# Patient Record
Sex: MALE | Race: OTHER | HISPANIC OR LATINO | Employment: FULL TIME | URBAN - METROPOLITAN AREA
[De-identification: names, ages, dates, MRNs, and addresses within clinical notes are randomized per-mention and may not be internally consistent; named-entity substitution may affect disease eponyms.]

---

## 2017-02-21 ENCOUNTER — ALLSCRIPTS OFFICE VISIT (OUTPATIENT)
Dept: OTHER | Facility: OTHER | Age: 37
End: 2017-02-21

## 2017-02-21 DIAGNOSIS — I10 ESSENTIAL (PRIMARY) HYPERTENSION: ICD-10-CM

## 2017-03-24 ENCOUNTER — LAB CONVERSION - ENCOUNTER (OUTPATIENT)
Dept: OTHER | Facility: OTHER | Age: 37
End: 2017-03-24

## 2017-03-24 LAB
A/G RATIO (HISTORICAL): 1.7 (CALC) (ref 1–2.5)
ALBUMIN SERPL BCP-MCNC: 4.3 G/DL (ref 3.6–5.1)
ALP SERPL-CCNC: 54 U/L (ref 40–115)
ALT SERPL W P-5'-P-CCNC: 22 U/L (ref 9–46)
AST SERPL W P-5'-P-CCNC: 16 U/L (ref 10–40)
BASOPHILS # BLD AUTO: 0.4 %
BASOPHILS # BLD AUTO: 28 CELLS/UL (ref 0–200)
BILIRUB SERPL-MCNC: 0.4 MG/DL (ref 0.2–1.2)
BUN SERPL-MCNC: 14 MG/DL (ref 7–25)
BUN/CREA RATIO (HISTORICAL): NORMAL (CALC) (ref 6–22)
CALCIUM SERPL-MCNC: 9.2 MG/DL (ref 8.6–10.3)
CHLORIDE SERPL-SCNC: 106 MMOL/L (ref 98–110)
CHOLEST SERPL-MCNC: 174 MG/DL (ref 125–200)
CHOLEST/HDLC SERPL: 4.4 (CALC)
CO2 SERPL-SCNC: 26 MMOL/L (ref 20–31)
CREAT SERPL-MCNC: 0.88 MG/DL (ref 0.6–1.35)
DEPRECATED RDW RBC AUTO: 14.2 % (ref 11–15)
EGFR AFRICAN AMERICAN (HISTORICAL): 128 ML/MIN/1.73M2
EGFR-AMERICAN CALC (HISTORICAL): 111 ML/MIN/1.73M2
EOSINOPHIL # BLD AUTO: 1.9 %
EOSINOPHIL # BLD AUTO: 131 CELLS/UL (ref 15–500)
GAMMA GLOBULIN (HISTORICAL): 2.5 G/DL (CALC) (ref 1.9–3.7)
GLUCOSE (HISTORICAL): 94 MG/DL (ref 65–99)
HCT VFR BLD AUTO: 46.1 % (ref 38.5–50)
HDLC SERPL-MCNC: 40 MG/DL
HGB BLD-MCNC: 15.3 G/DL (ref 13.2–17.1)
LDL CHOLESTEROL (HISTORICAL): 115 MG/DL (CALC)
LYMPHOCYTES # BLD AUTO: 2001 CELLS/UL (ref 850–3900)
LYMPHOCYTES # BLD AUTO: 29 %
MCH RBC QN AUTO: 30.6 PG (ref 27–33)
MCHC RBC AUTO-ENTMCNC: 33.1 G/DL (ref 32–36)
MCV RBC AUTO: 92.5 FL (ref 80–100)
MONOCYTES # BLD AUTO: 428 CELLS/UL (ref 200–950)
MONOCYTES (HISTORICAL): 6.2 %
NEUTROPHILS # BLD AUTO: 4313 CELLS/UL (ref 1500–7800)
NEUTROPHILS # BLD AUTO: 62.5 %
NON-HDL-CHOL (CHOL-HDL) (HISTORICAL): 134 MG/DL (CALC)
PLATELET # BLD AUTO: 247 THOUSAND/UL (ref 140–400)
PMV BLD AUTO: 9.2 FL (ref 7.5–12.5)
POTASSIUM SERPL-SCNC: 4.6 MMOL/L (ref 3.5–5.3)
RBC # BLD AUTO: 4.98 MILLION/UL (ref 4.2–5.8)
SODIUM SERPL-SCNC: 140 MMOL/L (ref 135–146)
TOTAL PROTEIN (HISTORICAL): 6.8 G/DL (ref 6.1–8.1)
TRIGL SERPL-MCNC: 95 MG/DL
TSH SERPL DL<=0.05 MIU/L-ACNC: 0.91 MIU/L (ref 0.4–4.5)
WBC # BLD AUTO: 6.9 THOUSAND/UL (ref 3.8–10.8)

## 2017-08-31 ENCOUNTER — GENERIC CONVERSION - ENCOUNTER (OUTPATIENT)
Dept: OTHER | Facility: OTHER | Age: 37
End: 2017-08-31

## 2018-01-13 VITALS — WEIGHT: 196.13 LBS | SYSTOLIC BLOOD PRESSURE: 124 MMHG | TEMPERATURE: 98.7 F | DIASTOLIC BLOOD PRESSURE: 70 MMHG

## 2018-10-11 ENCOUNTER — OFFICE VISIT (OUTPATIENT)
Dept: FAMILY MEDICINE CLINIC | Facility: CLINIC | Age: 38
End: 2018-10-11
Payer: COMMERCIAL

## 2018-10-11 VITALS
TEMPERATURE: 96.2 F | SYSTOLIC BLOOD PRESSURE: 100 MMHG | BODY MASS INDEX: 30.57 KG/M2 | DIASTOLIC BLOOD PRESSURE: 80 MMHG | HEIGHT: 67 IN | WEIGHT: 194.8 LBS

## 2018-10-11 DIAGNOSIS — Z00.00 ROUTINE ADULT HEALTH MAINTENANCE: ICD-10-CM

## 2018-10-11 DIAGNOSIS — R35.1 NOCTURIA: ICD-10-CM

## 2018-10-11 DIAGNOSIS — R35.0 URINARY FREQUENCY: Primary | ICD-10-CM

## 2018-10-11 DIAGNOSIS — E78.5 BORDERLINE HYPERLIPIDEMIA: ICD-10-CM

## 2018-10-11 PROBLEM — J06.9 ACUTE UPPER RESPIRATORY INFECTION: Status: ACTIVE | Noted: 2017-02-21

## 2018-10-11 PROBLEM — I10 BENIGN ESSENTIAL HYPERTENSION: Status: ACTIVE | Noted: 2017-02-21

## 2018-10-11 PROCEDURE — 90715 TDAP VACCINE 7 YRS/> IM: CPT | Performed by: FAMILY MEDICINE

## 2018-10-11 PROCEDURE — 90472 IMMUNIZATION ADMIN EACH ADD: CPT | Performed by: FAMILY MEDICINE

## 2018-10-11 PROCEDURE — 90682 RIV4 VACC RECOMBINANT DNA IM: CPT | Performed by: FAMILY MEDICINE

## 2018-10-11 PROCEDURE — 99395 PREV VISIT EST AGE 18-39: CPT | Performed by: FAMILY MEDICINE

## 2018-10-11 PROCEDURE — 90471 IMMUNIZATION ADMIN: CPT | Performed by: FAMILY MEDICINE

## 2018-10-11 PROCEDURE — 99214 OFFICE O/P EST MOD 30 MIN: CPT | Performed by: FAMILY MEDICINE

## 2018-10-11 RX ORDER — MULTIVITAMIN
1 CAPSULE ORAL DAILY
COMMUNITY

## 2018-10-11 NOTE — PROGRESS NOTES
Assessment/Plan:    55-year-old male with:  Urinary frequency, nocturia borderline hyperlipidemia  Discussed workup and treatment options with risks and benefits  Will check urine and PSA  Will begin trial of Myrbetriq daily and advised patient to call back if symptoms worsen otherwise will follow up in 1 month  Discussed continued healthy diet and exercise and other supportive care  Discussed limiting dietary caffeine, spicy foods, acidic foods and alcohol as these can worsen his symptoms as well  No problem-specific Assessment & Plan notes found for this encounter  Diagnoses and all orders for this visit:    Urinary frequency  -     Mirabegron ER (MYRBETRIQ) 25 MG TB24; Take 25 mg by mouth daily  -     PSA Total, Diagnostic; Future  -     Urinalysis with reflex to microscopic  -     Urine culture; Future    Nocturia  -     Mirabegron ER (MYRBETRIQ) 25 MG TB24; Take 25 mg by mouth daily  -     PSA Total, Diagnostic; Future  -     Urinalysis with reflex to microscopic  -     Urine culture; Future    Borderline hyperlipidemia    Other orders  -     Multiple Vitamin (MULTIVITAMIN) capsule; Take 1 capsule by mouth daily          Subjective:   Chief Complaint   Patient presents with    Well Check     Pt states that they have been urinating too much since hernia surgery last august      Immunizations     Pt would like flu and TDAP vaccine  Patient ID: Von Pickett is a 45 y o  male  Patient is a 55-year-old male who presents for follow-up on urinary frequency, and nocturia that is been going on for the past year  Patient admits that is getting worse and especially is bad during the day with alcohol and also at night when he wakes up 3-4 times at night  No dysuria or flank pain  No testicular pain  No other complaints at this point  Patient is borderline hyperlipidemia but has been very well controlled and generally is healthy with regards to his lifestyle    No fevers chills nausea vomiting  Tolerating p o  intake  No other complaints at this time  The following portions of the patient's history were reviewed and updated as appropriate: allergies, current medications, past family history, past medical history, past social history, past surgical history and problem list     Review of Systems   Constitutional: Negative  HENT: Negative  Eyes: Negative  Respiratory: Negative  Cardiovascular: Negative  Gastrointestinal: Negative  Endocrine: Negative  Genitourinary: Positive for frequency and urgency  Negative for dysuria, flank pain, penile pain, penile swelling and testicular pain  Musculoskeletal: Negative  Allergic/Immunologic: Negative  Neurological: Negative  Hematological: Negative  Psychiatric/Behavioral: Negative  All other systems reviewed and are negative  Objective:      /80 (BP Location: Right arm, Patient Position: Sitting, Cuff Size: Standard)   Temp (!) 96 2 °F (35 7 °C) (Tympanic)   Ht 5' 7" (1 702 m)   Wt 88 4 kg (194 lb 12 8 oz)   BMI 30 51 kg/m²          Physical Exam   Constitutional: He is oriented to person, place, and time  He appears well-developed and well-nourished  HENT:   Head: Atraumatic  Right Ear: External ear normal    Left Ear: External ear normal    Eyes: Pupils are equal, round, and reactive to light  Conjunctivae and EOM are normal    Neck: Normal range of motion  Cardiovascular: Normal rate, regular rhythm and normal heart sounds  Pulmonary/Chest: Effort normal and breath sounds normal  No respiratory distress  Abdominal: Soft  He exhibits no distension  There is no tenderness  There is no rebound and no guarding  Musculoskeletal: Normal range of motion  Neurological: He is alert and oriented to person, place, and time  No cranial nerve deficit  Skin: Skin is warm and dry  Psychiatric: He has a normal mood and affect   His behavior is normal  Judgment and thought content normal

## 2018-10-11 NOTE — PROGRESS NOTES
Assessment/Plan:    61-year-old male with: Annual health maintenance exam   Discussed various safety and health maintenance issues including healthy diet like the Mediterranean diet, exercise, healthy weight as tolerated, ample sleep and stress reduction  Discussed supportive care return parameters  Patient to get flu shot and Tdap as well  No problem-specific Assessment & Plan notes found for this encounter  Diagnoses and all orders for this visit:    Urinary frequency  -     Mirabegron ER (MYRBETRIQ) 25 MG TB24; Take 25 mg by mouth daily  -     PSA Total, Diagnostic; Future  -     Urinalysis with reflex to microscopic  -     Urine culture; Future    Nocturia  -     Mirabegron ER (MYRBETRIQ) 25 MG TB24; Take 25 mg by mouth daily  -     PSA Total, Diagnostic; Future  -     Urinalysis with reflex to microscopic  -     Urine culture; Future    Borderline hyperlipidemia    Routine adult health maintenance    Other orders  -     Multiple Vitamin (MULTIVITAMIN) capsule; Take 1 capsule by mouth daily          Subjective:   Chief complaint:  Routine health maintenance exam     Patient ID: Alena Medina is a 45 y o  male  Patient is a 61-year-old male who presents for an annual well visit  Patient admits that he is physically active in generally eats a healthy diet  He sleeps well  No acute health maintenance complaints  He needs the flu shot and a Tdap as his sister is pregnant  The following portions of the patient's history were reviewed and updated as appropriate: allergies, current medications, past family history, past medical history, past social history, past surgical history and problem list     Review of Systems   Constitutional: Negative  HENT: Negative  Eyes: Negative  Respiratory: Negative  Cardiovascular: Negative  Gastrointestinal: Negative  Endocrine: Negative  Genitourinary: Positive for frequency and urgency  Musculoskeletal: Negative  Allergic/Immunologic: Negative  Neurological: Negative  Hematological: Negative  Psychiatric/Behavioral: Negative  All other systems reviewed and are negative  Objective:      /80 (BP Location: Right arm, Patient Position: Sitting, Cuff Size: Standard)   Temp (!) 96 2 °F (35 7 °C) (Tympanic)   Ht 5' 7" (1 702 m)   Wt 88 4 kg (194 lb 12 8 oz)   BMI 30 51 kg/m²          Physical Exam   Constitutional: He is oriented to person, place, and time  He appears well-developed and well-nourished  HENT:   Head: Atraumatic  Right Ear: External ear normal    Left Ear: External ear normal    Eyes: Pupils are equal, round, and reactive to light  Conjunctivae and EOM are normal    Neck: Normal range of motion  Cardiovascular: Normal rate, regular rhythm and normal heart sounds  Pulmonary/Chest: Effort normal and breath sounds normal  No respiratory distress  Abdominal: Soft  He exhibits no distension  There is no tenderness  There is no rebound and no guarding  Musculoskeletal: Normal range of motion  Neurological: He is alert and oriented to person, place, and time  No cranial nerve deficit  Skin: Skin is warm and dry  Psychiatric: He has a normal mood and affect   His behavior is normal  Judgment and thought content normal

## 2018-10-29 LAB
APPEARANCE UR: CLEAR
BILIRUB UR QL STRIP: NEGATIVE
COLOR UR: YELLOW
GLUCOSE UR QL STRIP: NEGATIVE
HGB UR QL STRIP: NEGATIVE
KETONES UR QL STRIP: NEGATIVE
LEUKOCYTE ESTERASE UR QL STRIP: NEGATIVE
NITRITE UR QL STRIP: NEGATIVE
PH UR STRIP: 6 [PH] (ref 5–8)
PROT UR QL STRIP: NEGATIVE
PSA SERPL-MCNC: 1.9 NG/ML
SP GR UR STRIP: 1.01 (ref 1–1.03)

## 2018-11-14 ENCOUNTER — HOSPITAL ENCOUNTER (EMERGENCY)
Facility: HOSPITAL | Age: 38
Discharge: HOME | End: 2018-11-14
Attending: EMERGENCY MEDICINE
Payer: OTHER MISCELLANEOUS

## 2018-11-14 ENCOUNTER — APPOINTMENT (EMERGENCY)
Dept: RADIOLOGY | Facility: HOSPITAL | Age: 38
End: 2018-11-14
Payer: OTHER MISCELLANEOUS

## 2018-11-14 VITALS
SYSTOLIC BLOOD PRESSURE: 134 MMHG | HEART RATE: 80 BPM | RESPIRATION RATE: 18 BRPM | TEMPERATURE: 98 F | DIASTOLIC BLOOD PRESSURE: 91 MMHG | OXYGEN SATURATION: 98 %

## 2018-11-14 DIAGNOSIS — S61.316A LACERATION OF RIGHT LITTLE FINGER WITHOUT FOREIGN BODY WITH DAMAGE TO NAIL, INITIAL ENCOUNTER: Primary | ICD-10-CM

## 2018-11-14 PROCEDURE — 99283 EMERGENCY DEPT VISIT LOW MDM: CPT

## 2018-11-14 PROCEDURE — 73140 X-RAY EXAM OF FINGER(S): CPT

## 2018-11-14 RX ORDER — CEPHALEXIN 250 MG/1
1000 CAPSULE ORAL ONCE
Status: COMPLETED | OUTPATIENT
Start: 2018-11-14 | End: 2018-11-14

## 2018-11-14 RX ORDER — LIDOCAINE HYDROCHLORIDE 10 MG/ML
2 INJECTION, SOLUTION EPIDURAL; INFILTRATION; INTRACAUDAL; PERINEURAL ONCE
Status: COMPLETED | OUTPATIENT
Start: 2018-11-14 | End: 2018-11-14

## 2018-11-14 RX ORDER — BUPIVACAINE HYDROCHLORIDE 2.5 MG/ML
5 INJECTION, SOLUTION EPIDURAL; INFILTRATION; INTRACAUDAL ONCE
Status: COMPLETED | OUTPATIENT
Start: 2018-11-14 | End: 2018-11-14

## 2018-11-14 RX ORDER — LIDOCAINE HYDROCHLORIDE 10 MG/ML
4 INJECTION, SOLUTION EPIDURAL; INFILTRATION; INTRACAUDAL; PERINEURAL ONCE
Status: DISCONTINUED | OUTPATIENT
Start: 2018-11-14 | End: 2018-11-14

## 2018-11-14 RX ORDER — OXYCODONE HYDROCHLORIDE AND ACETAMINOPHEN 5; 325 MG/1; MG/1
1 TABLET ORAL EVERY 4 HOURS PRN
Qty: 12 TABLET | Refills: 0 | Status: SHIPPED | OUTPATIENT
Start: 2018-11-14 | End: 2018-11-24

## 2018-11-14 RX ORDER — CEPHALEXIN 250 MG/1
500 CAPSULE ORAL 4 TIMES DAILY
Qty: 80 CAPSULE | Refills: 0 | Status: SHIPPED | OUTPATIENT
Start: 2018-11-14 | End: 2018-11-24

## 2018-11-14 RX ORDER — IBUPROFEN 400 MG/1
400 TABLET ORAL ONCE
Status: COMPLETED | OUTPATIENT
Start: 2018-11-14 | End: 2018-11-14

## 2018-11-14 RX ORDER — HYDROCODONE BITARTRATE AND ACETAMINOPHEN 5; 325 MG/1; MG/1
1 TABLET ORAL ONCE
Status: COMPLETED | OUTPATIENT
Start: 2018-11-14 | End: 2018-11-14

## 2018-11-14 RX ADMIN — LIDOCAINE HYDROCHLORIDE 2 ML: 10 INJECTION, SOLUTION EPIDURAL; INFILTRATION; INTRACAUDAL; PERINEURAL at 16:50

## 2018-11-14 RX ADMIN — CEPHALEXIN 1000 MG: 250 CAPSULE ORAL at 16:50

## 2018-11-14 RX ADMIN — BUPIVACAINE HYDROCHLORIDE 5 ML: 2.5 INJECTION, SOLUTION EPIDURAL; INFILTRATION; INTRACAUDAL at 16:51

## 2018-11-14 RX ADMIN — IBUPROFEN 400 MG: 400 TABLET ORAL at 15:26

## 2018-11-14 RX ADMIN — HYDROCODONE BITARTRATE AND ACETAMINOPHEN 1 TABLET: 5; 325 TABLET ORAL at 16:49

## 2018-11-14 NOTE — ED PROVIDER NOTES
History  Chief Complaint   Patient presents with    Finger Injury     Patient reports at 2:75[facility] a hydrolic lift cylinder and a spring shot out of the end hitting patients finger  Right fifth digit noted for circumfrential laceration  Bleeding, controlled with pressure and bandaid  History provided by:  Patient   used: No    Hand Injury   Location:  Finger  Finger location:  R little finger  Injury: yes    Time since incident:  1 hour  Mechanism of injury comment:  Contact with metal coil/spring as it broke  Pain details:     Quality:  Shooting    Radiates to:  Does not radiate    Severity:  Severe    Onset quality:  Sudden    Duration:  1 hour    Timing:  Constant    Progression:  Waxing and waning  Handedness:  Right-handed  Dislocation: no    Foreign body present:  No foreign bodies  Tetanus status:  Up to date  Prior injury to area:  No  Relieved by:  Nothing  Worsened by:  Nothing  Ineffective treatments:  None tried      Prior to Admission Medications   Prescriptions Last Dose Informant Patient Reported? Taking? Mirabegron ER (MYRBETRIQ) 25 MG TB24   No No   Sig: Take 25 mg by mouth daily   Multiple Vitamin (MULTIVITAMIN) capsule  Self Yes No   Sig: Take 1 capsule by mouth daily      Facility-Administered Medications: None       Past Medical History:   Diagnosis Date    Hypertension     Last assessed 2/21/2017        Past Surgical History:   Procedure Laterality Date    KNEE SURGERY      Last assessed 2/21/2017        Family History   Problem Relation Age of Onset    Heart attack Father     Hypertension Father     Breast cancer Family     Breast cancer Family      I have reviewed and agree with the history as documented  Social History   Substance Use Topics    Smoking status: Never Smoker    Smokeless tobacco: Never Used    Alcohol use Yes      Comment: Social         Review of Systems   Respiratory: Negative for shortness of breath      Cardiovascular: Negative for chest pain  Gastrointestinal: Negative for nausea  Musculoskeletal: Positive for arthralgias (R little finger)  Skin: Positive for wound  Neurological: Negative for dizziness and light-headedness  Psychiatric/Behavioral: Negative for self-injury  All other systems reviewed and are negative  Physical Exam  Physical Exam   Constitutional: He is oriented to person, place, and time  He appears well-developed and well-nourished  HENT:   Head: Normocephalic and atraumatic  Eyes: Pupils are equal, round, and reactive to light  Cardiovascular: Normal rate and regular rhythm  Pulmonary/Chest: Effort normal and breath sounds normal    Neurological: He is alert and oriented to person, place, and time  Skin: Skin is warm and dry  Capillary refill takes less than 2 seconds  Nursing note and vitals reviewed        Vital Signs  ED Triage Vitals [11/14/18 1522]   Temperature Pulse Respirations Blood Pressure SpO2   98 °F (36 7 °C) 100 20 127/72 96 %      Temp Source Heart Rate Source Patient Position - Orthostatic VS BP Location FiO2 (%)   Oral Monitor Sitting Right arm --      Pain Score       Worst Possible Pain           Vitals:    11/14/18 1522 11/14/18 1625 11/14/18 1754   BP: 127/72 116/89 134/91   Pulse: 100 95 80   Patient Position - Orthostatic VS: Sitting Sitting Sitting       Visual Acuity      ED Medications  Medications   ibuprofen (MOTRIN) tablet 400 mg (400 mg Oral Given 11/14/18 1526)   HYDROcodone-acetaminophen (NORCO) 5-325 mg per tablet 1 tablet (1 tablet Oral Given 11/14/18 1649)   bupivacaine (PF) (MARCAINE) 0 25 % injection 5 mL (5 mL Infiltration Given 11/14/18 1651)   lidocaine (PF) (XYLOCAINE-MPF) 1 % injection 2 mL (2 mL Infiltration Given 11/14/18 1650)   cephalexin (KEFLEX) capsule 1,000 mg (1,000 mg Oral Given 11/14/18 1650)       Diagnostic Studies  Results Reviewed     None                 XR finger fifth digit - pinkie RIGHT    (Results Pending) Procedures  Lac Repair  Date/Time: 11/14/2018 8:43 PM  Performed by: Lesley Rust  Authorized by: Lesley Rust   Consent: Verbal consent obtained  Risks and benefits: risks, benefits and alternatives were discussed  Consent given by: patient  Patient understanding: patient states understanding of the procedure being performed  Patient consent: the patient's understanding of the procedure matches consent given  Procedure consent: procedure consent matches procedure scheduled  Relevant documents: relevant documents present and verified  Site marked: the operative site was marked  Radiology Images displayed and confirmed  If images not available, report reviewed: imaging studies available  Patient identity confirmed: verbally with patient, arm band and hospital-assigned identification number  Time out: Immediately prior to procedure a "time out" was called to verify the correct patient, procedure, equipment, support staff and site/side marked as required  Body area: upper extremity  Location details: right small finger  Laceration length: 2 5 cm  Foreign bodies: no foreign bodies  Tendon involvement: none  Nerve involvement: none  Anesthesia: digital block and local infiltration    Anesthesia:  Local Anesthetic: bupivacaine 0 5% without epinephrine and lidocaine 1% without epinephrine  Anesthetic total: 10 mL      Procedure Details:  Irrigation solution: saline  Irrigation method: syringe  Amount of cleaning: extensive  Debridement: none  Degree of undermining: none  Wound skin closure material used: 4-0 Vicryl    Number of sutures: 2 (one continuous, one simple interrupted)  Technique: running and simple  Approximation: loose  Approximation difficulty: complex (Pt fracture of distal tip created difficulty as instability altered ability to closely approximate)  Dressing: gauze roll, splint and antibiotic ointment             Phone Contacts  ED Phone Contact    ED Course MDM  Number of Diagnoses or Management Options  Laceration of right little finger without foreign body with damage to nail, initial encounter: new and does not require workup  Diagnosis management comments: 46 y/o M presents to ED due to laceration to his R little finger just PTA, working on hydraulic machinery when a coil broke and contacted his finger  Denies other injury or trauma  Pt reports his tetanus is UTD  VS reviewed  Exam reveals laceration of approx 2 5 cm involving the nailbed (see media for clinical picture), with bleeding though controlled with pressure  Will provide oral medication for pain, XR to r/o fx  Pt reassessed, XR reviewed by me with fx of the distal tip of the 5th digit  Consulted Ortho on call Dr Ferdie Brittle who recommended closure, Keflex 1 g today with 500 mg QID and f/u with Ortho on Friday  Laceration with some complications, difficulty closely approximating nailbed as fracture created instability of distal phalanx though hemostasis obtained  Pt given instructions regarding sxs to return to the ED, recommendations for f/u with Dr Twin Blanchard on Friday 11/16, prescriptions for Keflex for osteomyelitis prophylaxis with open fx and percocet for pain          Amount and/or Complexity of Data Reviewed  Tests in the radiology section of CPT®: ordered and reviewed  Review and summarize past medical records: yes  Discuss the patient with other providers: yes  Independent visualization of images, tracings, or specimens: yes    Risk of Complications, Morbidity, and/or Mortality  Presenting problems: moderate  Diagnostic procedures: moderate  Management options: moderate    Patient Progress  Patient progress: improved    CritCare Time    Disposition  Final diagnoses:   Laceration of right little finger without foreign body with damage to nail, initial encounter     Time reflects when diagnosis was documented in both MDM as applicable and the Disposition within this note Time User Action Codes Description Comment    11/14/2018  5:52 PM Tia Fallow Add [I99 [id number]] Laceration of right little finger without foreign body with damage to nail, initial encounter       ED Disposition     ED Disposition Condition Comment    Discharge  [facility] discharge to home/self care  Condition at discharge: Good        Follow-up Information     Follow up With Specialties Details Why [address], MD Orthopedic Surgery  For wound recheck [address]  [address]  [place] [address]  [phone]            Discharge Medication List as of 11/14/2018  5:59 PM      START taking these medications    Details   cephalexin (KEFLEX) 250 mg capsule Take 2 capsules (500 mg total) by mouth 4 (four) times a day for 10 days, Starting Wed 11/14/2018, Until Sat 11/24/2018, Print      oxyCODONE-acetaminophen (PERCOCET) 5-325 mg per tablet Take 1 tablet by mouth every 4 (four) hours as needed for moderate pain for up to 10 days Max Daily Amount: 6 tablets, Starting Wed 11/14/2018, Until Sat 11/24/2018, Print         CONTINUE these medications which have NOT CHANGED    Details   Mirabegron ER (MYRBETRIQ) 25 MG TB24 Take 25 mg by mouth daily, Starting Thu 10/11/2018, Normal      Multiple Vitamin (MULTIVITAMIN) capsule Take 1 capsule by mouth daily, Historical Med           No discharge procedures on file      ED Provider  Electronically Signed by           Stephanie Manrique PA-C  11/14/18 2056

## 2018-11-14 NOTE — DISCHARGE INSTRUCTIONS
Laceration   WHAT YOU NEED TO KNOW:   A laceration is an injury to the skin and the soft tissue underneath it  Lacerations happen when you are cut or hit by something  They can happen anywhere on the body  DISCHARGE INSTRUCTIONS:   Seek care immediately if:   · You have heavy bleeding or bleeding that does not stop after 10 minutes of holding firm, direct pressure over the wound  · Your wound opens up  Contact your healthcare provider if:   · You have a fever or chills  · Your laceration is red, warm, or swollen  · You have red streaks on your skin coming from your wound  · You have white or yellow drainage from the wound that smells bad  · You have pain that gets worse, even after treatment  · You have questions or concerns about your condition or care  Medicines:   · Prescription pain medicine  may be given  Ask how to take this medicine safely  · Antibiotics  help treat or prevent a bacterial infection  · Take your medicine as directed  Contact your healthcare provider if you think your medicine is not helping or if you have side effects  Tell him or her if you are allergic to any medicine  Keep a list of the medicines, vitamins, and herbs you take  Include the amounts, and when and why you take them  Bring the list or the pill bottles to follow-up visits  Carry your medicine list with you in case of an emergency  Care for your wound as directed:   · Do not get your wound wet  until your healthcare provider says it is okay  Do not soak your wound in water  Do not go swimming until your healthcare provider says it is okay  Carefully wash the wound with soap and water  Gently pat the area dry or allow it to air dry  · Change your bandages  when they get wet, dirty, or after washing  Apply new, clean bandages as directed  Do not apply elastic bandages or tape too tight  Do not put powders or lotions over your incision  · Apply antibiotic ointment as directed    Your healthcare provider may give you antibiotic ointment to put over your wound if you have stitches  If you have strips of tape over your incision, let them dry up and fall off on their own  If they do not fall off within 14 days, gently remove them  If you have glue over your wound, do not remove or pick at it  If your glue comes off, do not replace it with glue that you have at home  · Check your wound every day for signs of infection such as swelling, redness, or pus  Self-care:   · Apply ice  on your wound for 15 to 20 minutes every hour or as directed  Use an ice pack, or put crushed ice in a plastic bag  Cover it with a towel  Ice helps prevent tissue damage and decreases swelling and pain  · Use a splint as directed  A splint will decrease movement and stress on your wound  It may help it heal faster  A splint may be used for lacerations over joints or areas of your body that bend  Ask your healthcare provider how to apply and remove a splint  · Decrease scarring of your wound  by applying ointments as directed  Do not apply ointments until your healthcare provider says it is okay  You may need to wait until your wound is healed  Ask which ointment to buy and how often to use it  After your wound is healed, use sunscreen over the area when you are out in the sun  You should do this for at least 6 months to 1 year after your injury  Follow up with your healthcare provider as directed: You will need to return in 3 to 14 days to have stitches or staples removed  Write down your questions so you remember to ask them during your visits  © 2017 [address] [employer] [employer] Information is for End User's use only and may not be sold, redistributed or otherwise used for commercial purposes  All illustrations and images included in CareNotes® are the copyrighted property of A D A M , Inc  or Antony Tobias  The above information is an  only   It is not intended as medical advice for individual conditions or treatments  Talk to your doctor, nurse or pharmacist before following any medical regimen to see if it is safe and effective for you  Narcotic-Analgesic/Acetaminophen (By mouth)   Relieves pain  Brand Name(s): Capital w/Codeine, [place], [place], [place], Lorcet HD, Lorcet Plus, Lortab 10/325, Lortab 5/325, Lortab 7 5/325, Lortab Elixir, [place], [place], Vu, Trezix, Tylenol With Codeine No  4   There may be other brand names for this medicine  When This Medicine Should Not Be Used: You should not use this medicine if you have had an allergic reaction to acetaminophen, codeine, hydrocodone, propoxyphene, or sulfites  You should not use this medicine if you have had an allergic reaction to any other opioid pain medicine  How to Use This Medicine:   Liquid, Tablet, Capsule  · Your doctor will tell you how much medicine to use  Do not use more than directed  · This medicine contains acetaminophen  Read the labels of all other medicines you are using to see if they also contain acetaminophen, or ask your doctor or pharmacist  Anitra Perkins not use more than 4 grams (4,000 milligrams) total of acetaminophen in one day  · Drink plenty of liquids to help avoid constipation  If a dose is missed:   · Some of these medicines need to be used on a fixed schedule  If you miss a dose or forget to use your medicine, call your doctor pharmacist for instructions  Do not use extra medicine to make up for a missed dose  How to Store and Dispose of This Medicine:   · Store the medicine in a closed container at room temperature, away from heat, moisture, and direct light  Do not refrigerate or freeze the medicine  · Ask your pharmacist, doctor, or health caregiver about the best way to dispose of any outdated medicine or medicine no longer needed  · Keep all medicine out of the reach of children  Never share your medicine with anyone    Drugs and Foods to Avoid:   Ask your doctor or pharmacist before using any other medicine, including over-the-counter medicines, vitamins, and herbal products  · Make sure your doctor knows if you are using a monoamine oxidase inhibitor (MAOI) medicine, such as Eldepryl®, Marplan®, [place], or [place]®  Make sure your doctor knows if you are also using a medicine to treat depression, such as amitriptyline, doxepin, nortriptyline, Elavil®, Pamelor®, or Sinequan®  Make sure your doctor knows if you are taking an anticholinergic medicine, such as atropine, methscopolamine, or scopolamine  · Tell your doctor if you use anything else that makes you sleepy  Some examples are allergy medicine, narcotic pain medicine, and alcohol  · Do not drink alcohol while you are using this medicine  Warnings While Using This Medicine:   · Make sure your doctor knows if you are pregnant or breast feeding, or if you have a head injury, or other conditions that may cause an increase in intercranial pressure (pressure inside your head)  Make sure your doctor knows if you have severe kidney problems or severe liver problems, or if you have hypothyroidism (lack of thyroid function)  Make sure your doctor knows if you have [place]'s disease (adrenal gland disease), or if you have enlarged prostate or urethral stricture  Make sure your doctor knows if you have any abdominal problems, or if you have lung disease or asthma  · This medicine may make you dizzy or drowsy  Avoid driving, using machines, or anything else that could be dangerous if you are not alert  · This medicine can be habit-forming  Do not use more than your prescribed dose  Call your doctor if you think your medicine is not working  · Tell any doctor or dentist who treats you that you are using this medicine  This medicine may affect certain medical test results  · This medicine may cause constipation, especially with long-term use  Ask your doctor if you should use a laxative to prevent and treat constipation    · When a mother is breastfeeding and takes codeine, there is a very small chance that this medicine could cause serious side effects in the baby  This is because codeine works differently in a few women, so their breast milk contains too much medicine  If you take codeine, be alert for these signs of overdose in your nursing baby: sleeping more than usual, trouble breastfeeding, trouble breathing, or being limp and weak  Call the baby's doctor right away if you think there is a problem  If you cannot talk to the doctor, take the baby to the emergency room or call 911  Possible Side Effects While Using This Medicine:   Call your doctor right away if you notice any of these side effects:  · Allergic reaction: Itching or hives, swelling in your face or hands, swelling or tingling in your mouth or throat, chest tightness, trouble breathing  · Dizziness  · Seeing or hearing things that are not there  · Very slow heartbeat  If you notice these less serious side effects, talk with your doctor:   · Change in how much or how often you urinate  · Cold, clammy skin  · Feeling unusually anxious, excited, fearful, or tired  · Nausea, vomiting, constipation, stomach pain or upset, or heartburn  · Skin rash  · Vision changes  If you notice other side effects that you think are caused by this medicine, tell your doctor  Call your doctor for medical advice about side effects  You may report side effects to FDA at 3-218-FDA-0218  © 2017 [address] [employer] Information is for End User's use only and may not be sold, redistributed or otherwise used for commercial purposes  The above information is an  only  It is not intended as medical advice for individual conditions or treatments  Talk to your doctor, nurse or pharmacist before following any medical regimen to see if it is safe and effective for you

## 2018-11-19 ENCOUNTER — OFFICE VISIT (OUTPATIENT)
Dept: OBGYN CLINIC | Facility: OTHER | Age: 38
End: 2018-11-19
Payer: OTHER MISCELLANEOUS

## 2018-11-19 VITALS
HEART RATE: 68 BPM | WEIGHT: 196.4 LBS | DIASTOLIC BLOOD PRESSURE: 83 MMHG | HEIGHT: 67 IN | BODY MASS INDEX: 30.83 KG/M2 | SYSTOLIC BLOOD PRESSURE: 123 MMHG

## 2018-11-19 DIAGNOSIS — S62.666B OPEN NONDISPLACED FRACTURE OF DISTAL PHALANX OF RIGHT LITTLE FINGER, INITIAL ENCOUNTER: Primary | ICD-10-CM

## 2018-11-19 PROCEDURE — 99203 OFFICE O/P NEW LOW 30 MIN: CPT | Performed by: ORTHOPAEDIC SURGERY

## 2018-11-19 NOTE — PATIENT INSTRUCTIONS
Plan :  I explained the diagnosis to the patient and his friend and this fracture should heal   I am worried about the skin and nail healing and he must keep this clean and dry  I sterilely dressed the wound today and gave him the option of the Alumafoam splint versus the stack plastic splint and he should change the dressing once a day  He cannot work yet and I gave him a new work for no work for 2 weeks approximately  He should finish his Keflex by mouth  I will see him in 1 week for suture removal and re-evaluation  He does not need an x-ray then

## 2018-11-19 NOTE — LETTER
November 19, 2018     Amanda Leyden, [address]    Patient: Kiko Barajas   YOB: 1980   Date of Visit: 11/19/2018       Dear Dr Jessy Ring: Thank you for referring Kiko Barajas to me for evaluation  Below are my notes for this consultation  If you have questions, please do not hesitate to call me  I look forward to following your patient along with you  Sincerely,        Kya Onofre MD        CC: No Recipients  Kya Onofre MD  11/19/2018  3:35 PM  Sign at close encounter  Chief Complaint   Patient presents with    Right Hand - Fracture           Assessment:  Open fracture distal phalanx of the right little finger    Plan :  I explained the diagnosis to the patient and his friend and this fracture should heal   I am worried about the skin and nail healing and he must keep this clean and dry  I sterilely dressed the wound today and gave him the option of the Alumafoam splint versus the stack plastic splint and he should change the dressing once a day  He cannot work yet and I gave him a new work for no work for 2 weeks approximately  He should finish his Keflex by mouth  I will see him in 1 week for suture removal and re-evaluation  He does not need an x-ray then  HPI:     This 55-year-old [place] male injured his right little finger when a metal coil/spring broke and cut the tip of his right little finger  He is right-hand dominant  He went to the emergency room at [facility] on 11/14/18 where this was cleansed and sutured and he was placed on oral Keflex  For some reason he put the Keflex powder from the capsule right on the skin, although he also stated he has taken some of the pills    This injury did happen at work    PMHx:         Past Medical History:   Diagnosis Date    Hypertension     Last assessed 2/21/2017        Past Surgical History:   Procedure Laterality Date    KNEE SURGERY      Last assessed 2/21/2017 Family History   Problem Relation Age of Onset    Heart attack Father     Hypertension Father     Breast cancer Family     Breast cancer Family        Social History     Social History    Marital status: Single     Spouse name: N/A    Number of children: N/A    Years of education: N/A     Occupational History    Not on file  Social History Main Topics    Smoking status: Never Smoker    Smokeless tobacco: Never Used    Alcohol use Yes      Comment: Social     Drug use: No    Sexual activity: Not on file     Other Topics Concern    Not on file     Social History Narrative    No narrative on file       Current Outpatient Prescriptions   Medication Sig Dispense Refill    cephalexin (KEFLEX) 250 mg capsule Take 2 capsules (500 mg total) by mouth 4 (four) times a day for 10 days 80 capsule 0    Multiple Vitamin (MULTIVITAMIN) capsule Take 1 capsule by mouth daily      oxyCODONE-acetaminophen (PERCOCET) 5-325 mg per tablet Take 1 tablet by mouth every 4 (four) hours as needed for moderate pain for up to 10 days Max Daily Amount: 6 tablets 12 tablet 0     No current facility-administered medications for this visit  Allergies: Patient has no known allergies  ROS:  Positive for urinary frequency and nocturia and orthopedic complaints as above  The remaining 10/12 systems on the intake sheet that I reviewed were negative  PE:  /83   Pulse 68   Ht 5' 7" (1 702 m)   Wt 89 1 kg (196 lb 6 4 oz)   BMI 30 76 kg/m²    Constitutional: The patient was  oriented to person, place, and time  Well-developed and well-nourished  In no acute distress  HEENT: Vision intact  Hearing normal  Swallowing normal   Head: Normocephalic  Cardiovascular: Intact distal pulses  Pulse regular  Pulmonary/Chest: Effort normal  No respiratory distress  Neurological: Alert and oriented to person, place, and time  Skin: Skin is warm  Psychiatric: Normal mood and affect       Ortho Exam:  I cleansed the Keflex powder from his right hand with saline and peroxide  The sutures are clean and healing  The nail laceration appears to be just distal to the germinal matrix of the nail bed  He had normal sensation to light touch in the pulp of the finger with no angular or rotary deformity the finger  There was no cellulitis or redness proximally  Radial pulse was intact  Elbow and wrist motion was normal with no antecubital adenopathy noted      Studies reviewed:  X-rays were personally reviewed and showed a transverse fracture through the distal 3rd of the distal phalanx of the right little finger

## 2018-11-19 NOTE — PROGRESS NOTES
Chief Complaint   Patient presents with    Right Hand - Fracture           Assessment:  Open fracture distal phalanx of the right little finger    Plan :  I explained the diagnosis to the patient and his friend and this fracture should heal   I am worried about the skin and nail healing and he must keep this clean and dry  I sterilely dressed the wound today and gave him the option of the Alumafoam splint versus the stack plastic splint and he should change the dressing once a day  He cannot work yet and I gave him a new work for no work for 2 weeks approximately  He should finish his Keflex by mouth  I will see him in 1 week for suture removal and re-evaluation  He does not need an x-ray then  HPI:     This 26-year-old Far[place] male injured his right little finger when a metal coil/spring broke and cut the tip of his right little finger  He is right-hand dominant  He went to the emergency room at [facility] on 11/14/18 where this was cleansed and sutured and he was placed on oral Keflex  For some reason he put the Keflex powder from the capsule right on the skin, although he also stated he has taken some of the pills  This injury did happen at work    PMHx:         Past Medical History:   Diagnosis Date    Hypertension     Last assessed 2/21/2017        Past Surgical History:   Procedure Laterality Date    KNEE SURGERY      Last assessed 2/21/2017        Family History   Problem Relation Age of Onset    Heart attack Father     Hypertension Father     Breast cancer Family     Breast cancer Family        Social History     Social History    Marital status: Single     Spouse name: N/A    Number of children: N/A    Years of education: N/A     Occupational History    Not on file       Social History Main Topics    Smoking status: Never Smoker    Smokeless tobacco: Never Used    Alcohol use Yes      Comment: Social     Drug use: No    Sexual activity: Not on file     Other Topics Concern  Not on file     Social History Narrative    No narrative on file       Current Outpatient Prescriptions   Medication Sig Dispense Refill    cephalexin (KEFLEX) 250 mg capsule Take 2 capsules (500 mg total) by mouth 4 (four) times a day for 10 days 80 capsule 0    Multiple Vitamin (MULTIVITAMIN) capsule Take 1 capsule by mouth daily      oxyCODONE-acetaminophen (PERCOCET) 5-325 mg per tablet Take 1 tablet by mouth every 4 (four) hours as needed for moderate pain for up to 10 days Max Daily Amount: 6 tablets 12 tablet 0     No current facility-administered medications for this visit  Allergies: Patient has no known allergies  ROS:  Positive for urinary frequency and nocturia and orthopedic complaints as above  The remaining 10/12 systems on the intake sheet that I reviewed were negative  PE:  /83   Pulse 68   Ht 5' 7" (1 702 m)   Wt 89 1 kg (196 lb 6 4 oz)   BMI 30 76 kg/m²   Constitutional: The patient was  oriented to person, place, and time  Well-developed and well-nourished  In no acute distress  HEENT: Vision intact  Hearing normal  Swallowing normal   Head: Normocephalic  Cardiovascular: Intact distal pulses  Pulse regular  Pulmonary/Chest: Effort normal  No respiratory distress  Neurological: Alert and oriented to person, place, and time  Skin: Skin is warm  Psychiatric: Normal mood and affect  Ortho Exam:  I cleansed the Keflex powder from his right hand with saline and peroxide  The sutures are clean and healing  The nail laceration appears to be just distal to the germinal matrix of the nail bed  He had normal sensation to light touch in the pulp of the finger with no angular or rotary deformity the finger  There was no cellulitis or redness proximally  Radial pulse was intact  Elbow and wrist motion was normal with no antecubital adenopathy noted      Studies reviewed:  X-rays were personally reviewed and showed a transverse fracture through the distal 3rd of the distal phalanx of the right little finger

## 2018-11-19 NOTE — LETTER
November 19, 2018     Patient: Kiko Barajas   YOB: 1980   Date of Visit: 11/19/2018       To Whom it May Concern:    Kiko Barajas is under my professional care  He was seen in my office on 11/19/2018  He has a open fracture of his right little finger which is healing  He cannot return to normal duty for 2 weeks from now       If you have any questions or concerns, please don't hesitate to call           Sincerely,          Kya Onofre MD        CC: Kiko Anthonys

## 2018-11-26 ENCOUNTER — OFFICE VISIT (OUTPATIENT)
Dept: OBGYN CLINIC | Facility: OTHER | Age: 38
End: 2018-11-26
Payer: OTHER MISCELLANEOUS

## 2018-11-26 VITALS
DIASTOLIC BLOOD PRESSURE: 86 MMHG | HEART RATE: 92 BPM | BODY MASS INDEX: 30.76 KG/M2 | HEIGHT: 67 IN | WEIGHT: 196 LBS | SYSTOLIC BLOOD PRESSURE: 124 MMHG

## 2018-11-26 DIAGNOSIS — S62.666D OPEN NONDISPLACED FRACTURE OF DISTAL PHALANX OF RIGHT LITTLE FINGER WITH ROUTINE HEALING, SUBSEQUENT ENCOUNTER: Primary | ICD-10-CM

## 2018-11-26 PROCEDURE — 99213 OFFICE O/P EST LOW 20 MIN: CPT | Performed by: ORTHOPAEDIC SURGERY

## 2018-11-26 NOTE — PATIENT INSTRUCTIONS
The wound looks clean and is healing nicely  I removed part of the sutures but these seem to be absorbable sutures and some were still left in  I want him to soak the hand in warm soapy water for 5 minutes twice a day for the next 2 weeks  He should then rinse the soap off thoroughly, dry the skin, and then rub cocoa butter or vitamin-E cream firmly into the scar for 1 minute at least 2 or 3 times a day to soften the skin  He should wear the stack finger splint to protect the finger from further injury  He is a  and does heavy work and says that he cannot work; therefore, I will give him off for another 2 weeks  I will see him back again in 2 weeks for wound check, removal of any sutures that are remaining, and repeat x-ray of his finger prior to being seen

## 2018-11-26 NOTE — LETTER
November 26, 2018     Patient: Vicente Llamas   YOB: 1980   Date of Visit: 11/26/2018       To Whom it May Concern:    Vicente Llamas is under my professional care  He was seen in my office on 11/26/2018  He is still healing from an open fracture of his finger and cannot work for an additional 2 weeks  He remains splinted and I will see him back again in 2 weeks for repeat x-ray and routine follow-up  If you have any questions or concerns, please don't hesitate to call           Sincerely,          Zbigniew Mccall MD        CC: No Recipients

## 2018-11-26 NOTE — PROGRESS NOTES
Chief Complaint   Patient presents with    Right Hand - Follow-up           Assessment:  Open fracture distal phalanx of the right little finger    Plan : The wound looks clean and is healing nicely  I removed part of the sutures but these seem to be absorbable sutures and some were still left in  I want him to soak the hand in warm soapy water for 5 minutes twice a day for the next 2 weeks  He should then rinse the soap off thoroughly, dry the skin, and then rub cocoa butter or vitamin-E cream firmly into the scar for 1 minute at least 2 or 3 times a day to soften the skin  He should wear the stack finger splint to protect the finger from further injury  He is a  and does heavy work and says that he cannot work; therefore, I will give him off for another 2 weeks  I will see him back again in 2 weeks for wound check, removal of any sutures that are remaining, and repeat x-ray of his finger prior to being seen  HPI:     This 45-year-old [place] male injured his right little finger when a metal coil/spring broke and cut the tip of his right little finger  He is right-hand dominant  He went to the emergency room at [facility] on 11/14/18 where this was cleansed and sutured and he was placed on oral Keflex  For some reason he put the Keflex powder from the capsule right on the skin, although he also stated he has taken some of the pills  This injury did happen at work   This patient returns for a follow up on 11/26/18 and suture removal of his right small finger open fracture  He states he continues to be compliant with the oral antibiotics and use of his splint  He denies pain but complains of intermittent numbness and tingling to the tip of the small finger  /86   Pulse 92   Ht 5' 7" (1 702 m)   Wt 88 9 kg (196 lb)   BMI 30 70 kg/m²     Ortho Exam: On today's exam, the incision is clean, dry and intact  The sutures are clean and healing    The nail laceration appears to be healing well  There was normal sensation to light touch in the pulp of the finger with no angular or rotary deformity the finger  There was no cellulitis or redness proximally  Radial pulse was intact  Elbow and wrist motion with no pain  No antecubital adenopathy noted      Studies reviewed:  X-rays were personally reviewed and showed a transverse fracture through the distal 3rd of the distal phalanx of the right little finger

## 2018-11-26 NOTE — LETTER
November 26, 2018     Dolores Springer, [address]    Patient: Veronika Aparicio   YOB: 1980   Date of Visit: 11/26/2018       Dear Dr Eugenie Eckert: Thank you for referring Veronika Aparicio to me for evaluation  Below are my notes for this consultation  If you have questions, please do not hesitate to call me  I look forward to following your patient along with you  Sincerely,        Zhang Cordova MD        CC: No Recipients      Chief Complaint   Patient presents with    Right Hand - Follow-up           Assessment:  Open fracture distal phalanx of the right little finger    Plan : The wound looks clean and is healing nicely  I removed part of the sutures but these seem to be absorbable sutures and some were still left in  I want him to soak the hand in warm soapy water for 5 minutes twice a day for the next 2 weeks  He should then rinse the soap off thoroughly, dry the skin, and then rub cocoa butter or vitamin-E cream firmly into the scar for 1 minute at least 2 or 3 times a day to soften the skin  He should wear the stack finger splint to protect the finger from further injury  He is a  and does heavy work and says that he cannot work; therefore, I will give him off for another 2 weeks  I will see him back again in 2 weeks for wound check, removal of any sutures that are remaining, and repeat x-ray of his finger prior to being seen  HPI:     This 79-year-old [place] male injured his right little finger when a metal coil/spring broke and cut the tip of his right little finger  He is right-hand dominant  He went to the emergency room at [facility] on 11/14/18 where this was cleansed and sutured and he was placed on oral Keflex  For some reason he put the Keflex powder from the capsule right on the skin, although he also stated he has taken some of the pills    This injury did happen at work   This patient returns for a follow up on 11/26/18 and suture removal of his right small finger open fracture  He states he continues to be compliant with the oral antibiotics and use of his splint  He denies pain but complains of intermittent numbness and tingling to the tip of the small finger  /86   Pulse 92   Ht 5' 7" (1 702 m)   Wt 88 9 kg (196 lb)   BMI 30 70 kg/m²      Ortho Exam: On today's exam, the incision is clean, dry and intact  The sutures are clean and healing  The nail laceration appears to be healing well  There was normal sensation to light touch in the pulp of the finger with no angular or rotary deformity the finger  There was no cellulitis or redness proximally  Radial pulse was intact  Elbow and wrist motion with no pain  No antecubital adenopathy noted      Studies reviewed:  X-rays were personally reviewed and showed a transverse fracture through the distal 3rd of the distal phalanx of the right little finger

## 2018-12-05 ENCOUNTER — OFFICE VISIT (OUTPATIENT)
Dept: FAMILY MEDICINE CLINIC | Facility: CLINIC | Age: 38
End: 2018-12-05
Payer: COMMERCIAL

## 2018-12-05 VITALS
HEIGHT: 67 IN | SYSTOLIC BLOOD PRESSURE: 128 MMHG | DIASTOLIC BLOOD PRESSURE: 82 MMHG | TEMPERATURE: 99 F | WEIGHT: 195 LBS | BODY MASS INDEX: 30.61 KG/M2

## 2018-12-05 DIAGNOSIS — J06.9 ACUTE UPPER RESPIRATORY INFECTION: Primary | ICD-10-CM

## 2018-12-05 PROCEDURE — 3008F BODY MASS INDEX DOCD: CPT | Performed by: FAMILY MEDICINE

## 2018-12-05 PROCEDURE — 99213 OFFICE O/P EST LOW 20 MIN: CPT | Performed by: FAMILY MEDICINE

## 2018-12-05 RX ORDER — FLUTICASONE PROPIONATE 50 MCG
2 SPRAY, SUSPENSION (ML) NASAL DAILY
Qty: 16 G | Refills: 0 | Status: SHIPPED | OUTPATIENT
Start: 2018-12-05

## 2018-12-05 RX ORDER — AMOXICILLIN 500 MG/1
500 TABLET, FILM COATED ORAL 3 TIMES DAILY
Qty: 21 TABLET | Refills: 0 | Status: SHIPPED | OUTPATIENT
Start: 2018-12-05 | End: 2018-12-12

## 2018-12-05 RX ORDER — ALBUTEROL SULFATE 90 UG/1
2 AEROSOL, METERED RESPIRATORY (INHALATION) EVERY 4 HOURS PRN
Qty: 1 INHALER | Refills: 0 | Status: SHIPPED | OUTPATIENT
Start: 2018-12-05 | End: 2020-08-25

## 2018-12-05 NOTE — PROGRESS NOTES
Assessment/Plan:    27-year-old male with:  Acute URI  Discussed supportive care return parameters  Will give amoxicillin begin Flonase along with rescue inhaler and advised him to call back if not improving or worsening    No problem-specific Assessment & Plan notes found for this encounter  Diagnoses and all orders for this visit:    Acute upper respiratory infection  -     amoxicillin (AMOXIL) 500 MG tablet; Take 1 tablet (500 mg total) by mouth 3 (three) times a day for 7 days  -     fluticasone (FLONASE) 50 mcg/act nasal spray; 2 sprays into each nostril daily  -     albuterol (VENTOLIN HFA) 90 mcg/act inhaler; Inhale 2 puffs every 4 (four) hours as needed for wheezing          Subjective:   Chief Complaint   Patient presents with    Cough    Dizziness          Patient ID: Ignacio Gonzalez is a 45 y o  male  Patient is a 27-year-old male who presents complaining of over 1 week of cough congestion and some wheezing  And some fevers and chills  No nausea vomiting  Tolerating p o  intake  Cough   Associated symptoms include chills, a fever and wheezing  Dizziness   Associated symptoms include chills, congestion, coughing and a fever  The following portions of the patient's history were reviewed and updated as appropriate: allergies, current medications, past family history, past medical history, past social history, past surgical history and problem list     Review of Systems   Constitutional: Positive for chills and fever  HENT: Positive for congestion  Eyes: Negative  Respiratory: Positive for cough and wheezing  Cardiovascular: Negative  Gastrointestinal: Negative  Endocrine: Negative  Genitourinary: Negative  Musculoskeletal: Negative  Allergic/Immunologic: Negative  Neurological: Positive for dizziness  Hematological: Negative  Psychiatric/Behavioral: Negative  All other systems reviewed and are negative          Objective:      /82 (BP Location: Right arm, Patient Position: Sitting, Cuff Size: Standard)   Temp 99 °F (37 2 °C) (Tympanic)   Ht 5' 7" (1 702 m)   Wt 88 5 kg (195 lb)   BMI 30 54 kg/m²          Physical Exam   Constitutional: He is oriented to person, place, and time  He appears well-developed and well-nourished  HENT:   Head: Atraumatic  Right Ear: External ear normal    Left Ear: External ear normal    Eyes: Pupils are equal, round, and reactive to light  Conjunctivae and EOM are normal    Neck: Normal range of motion  Cardiovascular: Normal rate, regular rhythm and normal heart sounds  Pulmonary/Chest: Effort normal  No respiratory distress  He has wheezes  Abdominal: Soft  He exhibits no distension  There is no tenderness  There is no rebound and no guarding  Musculoskeletal: Normal range of motion  Neurological: He is alert and oriented to person, place, and time  No cranial nerve deficit  Skin: Skin is warm and dry  Psychiatric: He has a normal mood and affect   His behavior is normal  Judgment and thought content normal

## 2018-12-10 ENCOUNTER — OFFICE VISIT (OUTPATIENT)
Dept: OBGYN CLINIC | Facility: OTHER | Age: 38
End: 2018-12-10
Payer: COMMERCIAL

## 2018-12-10 VITALS
DIASTOLIC BLOOD PRESSURE: 87 MMHG | BODY MASS INDEX: 30.76 KG/M2 | WEIGHT: 196 LBS | HEIGHT: 67 IN | HEART RATE: 71 BPM | SYSTOLIC BLOOD PRESSURE: 121 MMHG

## 2018-12-10 DIAGNOSIS — S62.666D OPEN NONDISPLACED FRACTURE OF DISTAL PHALANX OF RIGHT LITTLE FINGER WITH ROUTINE HEALING, SUBSEQUENT ENCOUNTER: Primary | ICD-10-CM

## 2018-12-10 PROCEDURE — 99213 OFFICE O/P EST LOW 20 MIN: CPT | Performed by: ORTHOPAEDIC SURGERY

## 2018-12-10 NOTE — PROGRESS NOTES
Chief Complaint   Patient presents with    Right Hand - Follow-up           Assessment:  Open fracture distal phalanx of the right little finger    Plan :  I removed the distal nail as it was catching without incident and put a Band-Aid over the area  He can go just with a Band-Aid or wear the stack finger splint that I gave him to protect this area  He can certainly go back to work now and I gave a note to go back on 12/11/2018  I did caution him that it may be another 6-8 weeks until the soft tissues totally heal and may be longer for that for the nail to totally regenerate  I will see him back again in 7 weeks for clinical re-evaluation and repeat x-ray  He may continue with symptomatic treatment of ice for tender 15 min and Advil, Aleve, or Tylenol, only if needed for pain  He may use his hand for his normal activities daily living, letting pain be the warning guide to his activity level    HPI:     This 80-year-old Cong[place] male injured his right little finger when a metal coil/spring broke and cut the tip of his right little finger  He is right-hand dominant  He went to the emergency room at [facility] on 11/14/18 where this was cleansed and sutured and he was placed on oral Keflex  For some reason he put the Keflex powder from the capsule right on the skin, although he also stated he has taken some of the pills  This injury did happen at work   This patient returns for a follow up on 11/26/18 and suture removal of his right small finger open fracture  He states he continues to be compliant with the oral antibiotics and use of his splint  He denies pain but complains of intermittent numbness and tingling to the tip of the small finger  He comes in now on 12/10/2018  He is 4 weeks after an open fracture of the distal phalanx of the right little finger  He has not yet gone back to work    The nail has not yet fallen off and it does bother him as it is catching on objects     Physical exam: BP 121/87   Pulse 71   Ht 5' 7" (1 702 m)   Wt 88 9 kg (196 lb)   BMI 30 70 kg/m²   On today's exam, the right little finger appears straight  The distal half the nail has not fallen off and I removed the nail easily with a clear pink bed underneath  There is regeneration the nail from the germinal matrix proximally  He is still minimally tender over the pulp with good capillary refill and sensation to light touch  There is no cellulitis of this finger or the dorsum of the hand  Radial pulse was intact    Wrist flexion extension was also normal     Studies reviewed:  X-rays were personally reviewed previously and showed a transverse fracture through the distal 3rd of the distal phalanx of the right little finger

## 2018-12-10 NOTE — LETTER
December 10, 2018     Patient: Saima Wood   YOB: 1980   Date of Visit: 12/10/2018       To Whom it May Concern:    Saima Wood is under my professional care  He was seen in my office on 12/10/2018  He is recovering from a fractured finger and may return to full unrestricted work on 12/11/2018  I will continue to follow him in the office for final wound evaluation and x-ray in 7 weeks  If you have any questions or concerns, please don't hesitate to call           Sincerely,          Christian Herzog MD        CC: No Recipients

## 2018-12-10 NOTE — LETTER
December 10, 2018     Frederico Sacks, [address] [address]    Patient: Bibi Huang   YOB: 1980   Date of Visit: 12/10/2018       Dear Dr Minerva West: Thank you for referring Bbii Huang to me for evaluation  Below are my notes for this consultation  If you have questions, please do not hesitate to call me  I look forward to following your patient along with you  Sincerely,        Mykel Ford MD        CC: No Recipients  Mykel Ford MD  12/10/2018  1:52 PM  Sign at close encounter  Chief Complaint   Patient presents with    Right Hand - Follow-up           Assessment:  Open fracture distal phalanx of the right little finger    Plan :  I removed the distal nail as it was catching without incident and put a Band-Aid over the area  He can go just with a Band-Aid or wear the stack finger splint that I gave him to protect this area  He can certainly go back to work now and I gave a note to go back on 12/11/2018  I did caution him that it may be another 6-8 weeks until the soft tissues totally heal and may be longer for that for the nail to totally regenerate  I will see him back again in 7 weeks for clinical re-evaluation and repeat x-ray    HPI:     This 40-year-old [place] male injured his right little finger when a metal coil/spring broke and cut the tip of his right little finger  He is right-hand dominant  He went to the emergency room [facility] on 11/14/18 where this was cleansed and sutured and he was placed on oral Keflex  For some reason he put the Keflex powder from the capsule right on the skin, although he also stated he has taken some of the pills  This injury did happen at work   This patient returns for a follow up on 11/26/18 and suture removal of his right small finger open fracture  He states he continues to be compliant with the oral antibiotics and use of his splint   He denies pain but complains of intermittent numbness and tingling to the tip of the small finger  He comes in now on 12/10/2018  He is 4 weeks after an open fracture of the distal phalanx of the right little finger  He has not yet gone back to work  The nail has not yet fallen off and it does bother him as it is catching on objects     Physical exam: /87   Pulse 71   Ht 5' 7" (1 702 m)   Wt 88 9 kg (196 lb)   BMI 30 70 kg/m²    On today's exam, the right little finger appears straight  The distal half the nail has not fallen off and I removed the nail easily with a clear pink bed underneath  There is regeneration the nail from the germinal matrix proximally  He is still minimally tender over the pulp with good capillary refill and sensation to light touch  There is no cellulitis of this finger or the dorsum of the hand  Radial pulse was intact    Wrist flexion extension was also normal     Studies reviewed:  X-rays were personally reviewed previously and showed a transverse fracture through the distal 3rd of the distal phalanx of the right little finger

## 2018-12-10 NOTE — PATIENT INSTRUCTIONS
Plan :  I removed the distal nail as it was catching without incident and put a Band-Aid over the area  He can go just with a Band-Aid or wear the stack finger splint that I gave him to protect this area  He can certainly go back to work now and I gave a note to go back on 12/11/2018  I did caution him that it may be another 6-8 weeks until the soft tissues totally heal and may be longer for that for the nail to totally regenerate  I will see him back again in 7 weeks for clinical re-evaluation and repeat x-ray  He may continue with symptomatic treatment of ice for tender 15 min and Advil, Aleve, or Tylenol, only if needed for pain   He may use his hand for his normal activities daily living, letting pain be the warning guide to his activity level

## 2018-12-17 ENCOUNTER — TELEPHONE (OUTPATIENT)
Dept: FAMILY MEDICINE CLINIC | Facility: CLINIC | Age: 38
End: 2018-12-17

## 2018-12-18 NOTE — TELEPHONE ENCOUNTER
Message was relayed to the girlfriend  She will let the patient know and he will call back if he wishes to be seen in the office for re-evaluation per Dr Tom Stanley suggestion

## 2018-12-19 ENCOUNTER — OFFICE VISIT (OUTPATIENT)
Dept: FAMILY MEDICINE CLINIC | Facility: CLINIC | Age: 38
End: 2018-12-19
Payer: COMMERCIAL

## 2018-12-19 VITALS
BODY MASS INDEX: 31.14 KG/M2 | WEIGHT: 198.4 LBS | HEIGHT: 67 IN | TEMPERATURE: 96.8 F | DIASTOLIC BLOOD PRESSURE: 70 MMHG | SYSTOLIC BLOOD PRESSURE: 132 MMHG | OXYGEN SATURATION: 98 % | HEART RATE: 68 BPM

## 2018-12-19 DIAGNOSIS — R06.2 WHEEZING: Primary | ICD-10-CM

## 2018-12-19 PROCEDURE — 3008F BODY MASS INDEX DOCD: CPT | Performed by: FAMILY MEDICINE

## 2018-12-19 PROCEDURE — 99213 OFFICE O/P EST LOW 20 MIN: CPT | Performed by: FAMILY MEDICINE

## 2018-12-19 RX ORDER — PREDNISONE 20 MG/1
40 TABLET ORAL DAILY
Qty: 10 TABLET | Refills: 0 | Status: SHIPPED | OUTPATIENT
Start: 2018-12-19 | End: 2019-03-26 | Stop reason: SDUPTHER

## 2018-12-19 NOTE — PROGRESS NOTES
Assessment/Plan:    68-year-old male with:  Wheezing  Discussed treatment options with risks and benefits  Will give steroid burst and continue rescue inhaler as needed  Will give point of care breathing treatment advised him to call back if symptoms are not improving or worsening  No problem-specific Assessment & Plan notes found for this encounter  Diagnoses and all orders for this visit:    Wheezing  -     predniSONE 20 mg tablet; Take 2 tablets (40 mg total) by mouth daily          Subjective:     Chief Complaint   Patient presents with    Nasal Congestion     still having since last visit    Cough        Patient ID: Von Pickett is a 45 y o  male  Patient is a 68-year-old male who presents complaining of persistent cough and wheezing despite his medical treatment 2 weeks ago for his upper respiratory infection  He does feel improving overall but still has this nagging chest cough with wheezing  No fevers chills nausea vomiting  No other complaints at this time      Cough   Associated symptoms include wheezing  The following portions of the patient's history were reviewed and updated as appropriate: allergies, current medications, past family history, past medical history, past social history, past surgical history and problem list     Review of Systems   Constitutional: Negative  HENT: Negative  Eyes: Negative  Respiratory: Positive for cough and wheezing  Cardiovascular: Negative  Gastrointestinal: Negative  Endocrine: Negative  Genitourinary: Negative  Musculoskeletal: Negative  Allergic/Immunologic: Negative  Neurological: Negative  Hematological: Negative  Psychiatric/Behavioral: Negative  All other systems reviewed and are negative          Objective:      /70 (BP Location: Right arm, Patient Position: Sitting, Cuff Size: Standard)   Pulse 68   Temp (!) 96 8 °F (36 °C) (Tympanic)   Ht 5' 7" (1 702 m)   Wt 90 kg (198 lb 6 4 oz) SpO2 98%   BMI 31 07 kg/m²          Physical Exam   Constitutional: He is oriented to person, place, and time  He appears well-developed and well-nourished  HENT:   Head: Atraumatic  Right Ear: External ear normal    Left Ear: External ear normal    Eyes: Pupils are equal, round, and reactive to light  Conjunctivae and EOM are normal    Neck: Normal range of motion  Cardiovascular: Normal rate, regular rhythm and normal heart sounds  Pulmonary/Chest: Effort normal  No respiratory distress  He has wheezes  Abdominal: Soft  He exhibits no distension  There is no tenderness  There is no rebound and no guarding  Musculoskeletal: Normal range of motion  Neurological: He is alert and oriented to person, place, and time  No cranial nerve deficit  Skin: Skin is warm and dry  Psychiatric: He has a normal mood and affect   His behavior is normal  Judgment and thought content normal

## 2019-03-18 ENCOUNTER — HOSPITAL ENCOUNTER (EMERGENCY)
Facility: HOSPITAL | Age: 39
Discharge: HOME | End: 2019-03-18
Attending: EMERGENCY MEDICINE | Admitting: EMERGENCY MEDICINE
Payer: COMMERCIAL

## 2019-03-18 VITALS
DIASTOLIC BLOOD PRESSURE: 93 MMHG | TEMPERATURE: 97.7 F | BODY MASS INDEX: 31.14 KG/M2 | WEIGHT: 198.41 LBS | HEART RATE: 79 BPM | SYSTOLIC BLOOD PRESSURE: 141 MMHG | HEIGHT: 67 IN | RESPIRATION RATE: 16 BRPM | OXYGEN SATURATION: 98 %

## 2019-03-18 DIAGNOSIS — L23.7 POISON IVY DERMATITIS: Primary | ICD-10-CM

## 2019-03-18 PROCEDURE — 99282 EMERGENCY DEPT VISIT SF MDM: CPT

## 2019-03-18 RX ORDER — CALAMINE
LOTION (ML) TOPICAL AS NEEDED
Qty: 120 ML | Refills: 0 | Status: SHIPPED | OUTPATIENT
Start: 2019-03-18 | End: 2020-08-25

## 2019-03-18 RX ORDER — DIPHENHYDRAMINE HCL 25 MG
25 TABLET ORAL EVERY 6 HOURS PRN
Qty: 30 TABLET | Refills: 0 | Status: SHIPPED | OUTPATIENT
Start: 2019-03-18 | End: 2020-08-25

## 2019-03-18 RX ORDER — METHYLPREDNISOLONE 4 MG/1
TABLET ORAL
Qty: 21 TABLET | Refills: 0 | Status: SHIPPED | OUTPATIENT
Start: 2019-03-18 | End: 2019-03-26

## 2019-03-18 NOTE — ED PROVIDER NOTES
History  Chief Complaint   Patient presents with   [facility]     Pt was working outside yesterday and touched poison ivy  Now has swelling and itching on face ans hands     63-year-old male presents for evaluation of facial rash that began yesterday after working outside  The patient states that he has got poison ivy before this way  He complains of itchy rash which nothing has made it better  Nothing has made it worse  He denies any associated trouble breathing or swallowing  He has not tried any medications  Poison Ivy   Location:  Face  Quality:  Red and itchy  Severity:  Mild  Onset quality:  Gradual  Duration:  1 day  Timing:  Constant  Progression:  Unchanged  Chronicity:  New  Associated symptoms: rash    Associated symptoms: no cough, no fever, no shortness of breath, no sore throat and no wheezing        Prior to Admission Medications   Prescriptions Last Dose Informant Patient Reported? Taking?    Multiple Vitamin (MULTIVITAMIN) capsule Not Taking at Unknown time Self Yes No   Sig: Take 1 capsule by mouth daily   albuterol (VENTOLIN HFA) 90 mcg/act inhaler Not Taking at Unknown time  No No   Sig: Inhale 2 puffs every 4 (four) hours as needed for wheezing   Patient not taking: Reported on 3/18/2019   fluticasone (FLONASE) 50 mcg/act nasal spray Not Taking at Unknown time  No No   Si sprays into each nostril daily   Patient not taking: Reported on 3/18/2019   predniSONE 20 mg tablet Not Taking at Unknown time  No No   Sig: Take 2 tablets (40 mg total) by mouth daily   Patient not taking: Reported on 3/18/2019      Facility-Administered Medications: None       Past Medical History:   Diagnosis Date    Hypertension     Last assessed 2017        Past Surgical History:   Procedure Laterality Date    HERNIA REPAIR      KNEE SURGERY      Last assessed 2017        Family History   Problem Relation Age of Onset    Heart attack Father     Hypertension Father     Breast cancer Family  Breast cancer Family      I have reviewed and agree with the history as documented  Social History     Tobacco Use    Smoking status: Never Smoker    Smokeless tobacco: Never Used   Substance Use Topics    Alcohol use: Yes     Comment: Social     Drug use: No        Review of Systems   Constitutional: Negative for chills and fever  HENT: Negative for sore throat  Respiratory: Negative for cough, shortness of breath, wheezing and stridor  Skin: Positive for rash  All other systems reviewed and are negative  Physical Exam  Physical Exam   Constitutional: He appears well-developed and well-nourished  No distress  HENT:   Head: Normocephalic and atraumatic  Right Ear: External ear normal    Left Ear: External ear normal    Eyes: No scleral icterus  Neck: Normal range of motion  Cardiovascular: Normal rate and regular rhythm  Pulmonary/Chest: Effort normal  No stridor  No respiratory distress  He has no wheezes  Musculoskeletal: Normal range of motion  Neurological: He is alert  Skin: Rash noted  There is erythema ( Face)  Psychiatric: He has a normal mood and affect  Nursing note and vitals reviewed        Vital Signs  ED Triage Vitals [03/18/19 0711]   Temperature Pulse Respirations Blood Pressure SpO2   97 7 °F (36 5 °C) 79 16 141/93 98 %      Temp Source Heart Rate Source Patient Position - Orthostatic VS BP Location FiO2 (%)   Oral -- Sitting Right arm --      Pain Score       No Pain           Vitals:    03/18/19 0711   BP: 141/93   Pulse: 79   Patient Position - Orthostatic VS: Sitting       qSOFA     Row Name 03/18/19 0711                Altered mental status GCS < 15  --        Respiratory Rate > / =48  0        Systolic BP < / =733  0        Q Sofa Score  0              Visual Acuity      ED Medications  Medications - No data to display    Diagnostic Studies  Results Reviewed     None                 No orders to display              Procedures  Procedures Phone Contacts  ED Phone Contact    ED Course                               MDM    Disposition  Final diagnoses:   Poison ivy dermatitis     Time reflects when diagnosis was documented in both MDM as applicable and the Disposition within this note     Time User Action Codes Description Comment    3/18/2019  7:25 AM Liz Gaytan Add [L23 7] Poison ivy dermatitis       ED Disposition     ED Disposition Condition Date/Time Comment    Discharge Stable Mon Mar 18, 2019  7:25 AM Ignacio Gonzalez discharge to home/self care  Follow-up Information     Follow up With Specialties Details Why Contact Info    Luiza Joe MD Family Medicine In 1 week For further evaluation, if not improved [address]  [phone]            Discharge Medication List as of 3/18/2019  7:26 AM      START taking these medications    Details   calamine lotion Apply topically as needed for itching, Starting Mon 3/18/2019, Print      diphenhydrAMINE (BENADRYL) 25 mg tablet Take 1 tablet (25 mg total) by mouth every 6 (six) hours as needed for itching, Starting Mon 3/18/2019, Print      methylPREDNISolone 4 MG tablet therapy pack Use as directed on package, Print         CONTINUE these medications which have NOT CHANGED    Details   albuterol (VENTOLIN HFA) 90 mcg/act inhaler Inhale 2 puffs every 4 (four) hours as needed for wheezing, Starting Wed 12/5/2018, Normal      fluticasone (FLONASE) 50 mcg/act nasal spray 2 sprays into each nostril daily, Starting Wed 12/5/2018, Normal      Multiple Vitamin (MULTIVITAMIN) capsule Take 1 capsule by mouth daily, Historical Med      predniSONE 20 mg tablet Take 2 tablets (40 mg total) by mouth daily, Starting Wed 12/19/2018, Normal           No discharge procedures on file      ED Provider  Electronically Signed by           Mary Bullock,   03/19/19 9132

## 2019-03-26 ENCOUNTER — OFFICE VISIT (OUTPATIENT)
Dept: FAMILY MEDICINE CLINIC | Facility: CLINIC | Age: 39
End: 2019-03-26
Payer: COMMERCIAL

## 2019-03-26 VITALS
HEIGHT: 67 IN | BODY MASS INDEX: 32.8 KG/M2 | TEMPERATURE: 97 F | SYSTOLIC BLOOD PRESSURE: 114 MMHG | DIASTOLIC BLOOD PRESSURE: 82 MMHG | WEIGHT: 209 LBS

## 2019-03-26 DIAGNOSIS — R06.2 WHEEZING: ICD-10-CM

## 2019-03-26 DIAGNOSIS — L23.7 POISON IVY: Primary | ICD-10-CM

## 2019-03-26 PROCEDURE — 99213 OFFICE O/P EST LOW 20 MIN: CPT | Performed by: FAMILY MEDICINE

## 2019-03-26 PROCEDURE — 3008F BODY MASS INDEX DOCD: CPT | Performed by: FAMILY MEDICINE

## 2019-03-26 RX ORDER — PREDNISONE 10 MG/1
TABLET ORAL
Qty: 45 TABLET | Refills: 0 | Status: SHIPPED | OUTPATIENT
Start: 2019-03-26 | End: 2020-08-25

## 2019-03-26 NOTE — PROGRESS NOTES
Assessment/Plan:     Diagnoses and all orders for this visit:    Poison ivy    Wheezing  -     predniSONE 10 mg tablet; 5 pills daily for 3 days, 4 for 3 days, 3 for 3 days, 2 for 3 days, 1 for 3 days  Subjective:      Patient ID: Veronika Aparicio is a 45 y o  male  Patient is here with poison on bilateral hands for roughly 1 week  Patient did receive 4 day course of steroids which did improve situation but then rash reoccurred  Patient also did use calamine lotion  Patient does notice itching  No other new products  The following portions of the patient's history were reviewed and updated as appropriate: allergies, current medications, past family history, past medical history, past social history, past surgical history and problem list     Review of Systems   Constitutional: Negative  HENT: Negative  Eyes: Negative  Respiratory: Negative  Cardiovascular: Negative  Gastrointestinal: Negative  Endocrine: Negative  Genitourinary: Negative  Musculoskeletal: Negative  Skin: Positive for rash  Allergic/Immunologic: Negative  Neurological: Negative  Hematological: Negative  Psychiatric/Behavioral: Negative  Objective:      /82 (BP Location: Right arm, Patient Position: Sitting, Cuff Size: Adult)   Temp (!) 97 °F (36 1 °C) (Tympanic)   Ht 5' 7" (1 702 m)   Wt 94 8 kg (209 lb)   BMI 32 73 kg/m²          Physical Exam   Constitutional: He appears well-developed and well-nourished  Skin: Rash noted  Rash bilateral legs and hands consistent with poison

## 2019-07-12 DIAGNOSIS — R35.0 URINARY FREQUENCY: Primary | ICD-10-CM

## 2019-07-12 NOTE — TELEPHONE ENCOUNTER
Patient's girlfriend called to request refill of patient's bladder medication, because he is having that issue again  Thank you

## 2020-01-22 ENCOUNTER — OFFICE VISIT (OUTPATIENT)
Dept: FAMILY MEDICINE CLINIC | Facility: CLINIC | Age: 40
End: 2020-01-22
Payer: COMMERCIAL

## 2020-01-22 VITALS
TEMPERATURE: 101.9 F | DIASTOLIC BLOOD PRESSURE: 88 MMHG | HEIGHT: 67 IN | BODY MASS INDEX: 34.21 KG/M2 | SYSTOLIC BLOOD PRESSURE: 110 MMHG | WEIGHT: 218 LBS

## 2020-01-22 DIAGNOSIS — J06.9 ACUTE UPPER RESPIRATORY INFECTION: Primary | ICD-10-CM

## 2020-01-22 PROCEDURE — 99213 OFFICE O/P EST LOW 20 MIN: CPT | Performed by: FAMILY MEDICINE

## 2020-01-22 PROCEDURE — 3008F BODY MASS INDEX DOCD: CPT | Performed by: FAMILY MEDICINE

## 2020-01-22 RX ORDER — AMOXICILLIN 500 MG/1
500 TABLET, FILM COATED ORAL 3 TIMES DAILY
Qty: 21 TABLET | Refills: 0 | Status: SHIPPED | OUTPATIENT
Start: 2020-01-22 | End: 2020-01-29

## 2020-01-22 RX ORDER — FLUTICASONE PROPIONATE 50 MCG
2 SPRAY, SUSPENSION (ML) NASAL DAILY
Qty: 16 G | Refills: 0 | Status: SHIPPED | OUTPATIENT
Start: 2020-01-22 | End: 2020-08-25

## 2020-01-23 NOTE — PROGRESS NOTES
Assessment/Plan:    35-year-old male with:  Acute URI  Discussed supportive care return parameters  Will give Flonase and advised patient to take amoxicillin if symptoms are not improving or worsening  No problem-specific Assessment & Plan notes found for this encounter  Diagnoses and all orders for this visit:    Acute upper respiratory infection  -     amoxicillin (AMOXIL) 500 MG tablet; Take 1 tablet (500 mg total) by mouth 3 (three) times a day for 7 days  -     fluticasone (FLONASE) 50 mcg/act nasal spray; 2 sprays into each nostril daily          Subjective:     Chief Complaint   Patient presents with    Cold Like Symptoms     Patient is complaining of sore throat, headaches and soreness  Patient left work early due to sickness  Patient ID: Carrington Canales is a 44 y o  male  Patient is a 35-year-old male who presents complaining of several days of cough congestion and sinus pressure  No fevers chills nausea vomiting  Tolerating p o  intake      The following portions of the patient's history were reviewed and updated as appropriate: allergies, current medications, past family history, past medical history, past social history, past surgical history and problem list     Review of Systems   Constitutional: Negative  HENT: Positive for congestion and sinus pressure  Eyes: Negative  Respiratory: Positive for cough  Cardiovascular: Negative  Gastrointestinal: Negative  Endocrine: Negative  Genitourinary: Negative  Musculoskeletal: Negative  Allergic/Immunologic: Negative  Neurological: Negative  Hematological: Negative  Psychiatric/Behavioral: Negative  All other systems reviewed and are negative          Objective:      /88 (BP Location: Right arm, Patient Position: Sitting, Cuff Size: Large)   Temp (!) 101 9 °F (38 8 °C) (Tympanic)   Ht 5' 7" (1 702 m)   Wt 98 9 kg (218 lb)   BMI 34 14 kg/m²          Physical Exam   Constitutional: He is oriented to person, place, and time  He appears well-developed and well-nourished  HENT:   Head: Atraumatic  Right Ear: External ear normal    Left Ear: External ear normal    Eyes: Pupils are equal, round, and reactive to light  Conjunctivae and EOM are normal    Neck: Normal range of motion  Cardiovascular: Normal rate, regular rhythm and normal heart sounds  Pulmonary/Chest: Effort normal and breath sounds normal  No respiratory distress  Abdominal: Soft  He exhibits no distension  There is no tenderness  There is no rebound and no guarding  Musculoskeletal: Normal range of motion  Neurological: He is alert and oriented to person, place, and time  No cranial nerve deficit  Skin: Skin is warm and dry  Psychiatric: He has a normal mood and affect  His behavior is normal  Judgment and thought content normal          BMI Counseling: Body mass index is 34 14 kg/m²  The BMI is above normal  Nutrition recommendations include encouraging healthy choices of fruits and vegetables  Exercise recommendations include moderate physical activity 150 minutes/week

## 2020-01-24 ENCOUNTER — TELEPHONE (OUTPATIENT)
Dept: FAMILY MEDICINE CLINIC | Facility: CLINIC | Age: 40
End: 2020-01-24

## 2020-01-24 DIAGNOSIS — R06.2 WHEEZING: Primary | ICD-10-CM

## 2020-01-24 RX ORDER — ALBUTEROL SULFATE 90 UG/1
2 AEROSOL, METERED RESPIRATORY (INHALATION) EVERY 6 HOURS PRN
Qty: 1 INHALER | Refills: 1 | Status: SHIPPED | OUTPATIENT
Start: 2020-01-24

## 2020-01-24 NOTE — TELEPHONE ENCOUNTER
Patients' spouse called in today and stated that her  needs an inhaler due to he is still coughing and not feeling well  and could you place an order for one and send to [facility] on [address], please advise, thank you

## 2020-08-25 ENCOUNTER — OFFICE VISIT (OUTPATIENT)
Dept: FAMILY MEDICINE CLINIC | Facility: CLINIC | Age: 40
End: 2020-08-25
Payer: COMMERCIAL

## 2020-08-25 VITALS
TEMPERATURE: 98.4 F | WEIGHT: 219 LBS | BODY MASS INDEX: 34.3 KG/M2 | HEART RATE: 67 BPM | SYSTOLIC BLOOD PRESSURE: 120 MMHG | DIASTOLIC BLOOD PRESSURE: 74 MMHG | OXYGEN SATURATION: 98 %

## 2020-08-25 DIAGNOSIS — K62.5 BRBPR (BRIGHT RED BLOOD PER RECTUM): Primary | ICD-10-CM

## 2020-08-25 DIAGNOSIS — G47.19 EXCESSIVE DAYTIME SLEEPINESS: ICD-10-CM

## 2020-08-25 DIAGNOSIS — R14.0 ABDOMINAL BLOATING: ICD-10-CM

## 2020-08-25 PROCEDURE — 99214 OFFICE O/P EST MOD 30 MIN: CPT | Performed by: FAMILY MEDICINE

## 2020-08-25 PROCEDURE — 3078F DIAST BP <80 MM HG: CPT | Performed by: FAMILY MEDICINE

## 2020-08-25 PROCEDURE — 3074F SYST BP LT 130 MM HG: CPT | Performed by: FAMILY MEDICINE

## 2020-08-25 PROCEDURE — 1036F TOBACCO NON-USER: CPT | Performed by: FAMILY MEDICINE

## 2020-08-25 RX ORDER — SIMETHICONE 180 MG
180 CAPSULE ORAL EVERY 6 HOURS PRN
Qty: 30 CAPSULE | Refills: 1 | Status: SHIPPED | OUTPATIENT
Start: 2020-08-25

## 2020-08-26 NOTE — PROGRESS NOTES
Assessment/Plan:    20-year-old male with:  Bright red blood per rectum, abdominal bloating cysts daytime sleepiness will refer to Colorectal surgery and give simethicone to try as needed will refer for sleep study discussed supportive care return parameters advised call back not improving worsening    No problem-specific Assessment & Plan notes found for this encounter  Diagnoses and all orders for this visit:    BRBPR (bright red blood per rectum)  -     Ambulatory referral to Colorectal Surgery; Future  -     simethicone (MYLICON,GAS-X) 635 MG capsule; Take 1 capsule (180 mg total) by mouth every 6 (six) hours as needed (abdominal cramping)    Abdominal bloating  -     simethicone (MYLICON,GAS-X) 919 MG capsule; Take 1 capsule (180 mg total) by mouth every 6 (six) hours as needed (abdominal cramping)    Excessive daytime sleepiness  -     Ambulatory referral to Sleep Medicine; Future          Subjective:     Chief Complaint   Patient presents with    Rectal Bleeding     bright red bleeding for 5 days        Patient ID: Josue Westbrook is a 36 y o  male  Patient is a 20-year-old male presents complaining of several weeks of worsening bright red blood per rectum and also some abdominal bloating no fevers chills nausea vomiting  Tolerating p o  Intake no abdominal pain  Patient also does have some fatigue and excessive daytime sleepiness would like a sleep study      The following portions of the patient's history were reviewed and updated as appropriate: allergies, current medications, past family history, past medical history, past social history, past surgical history and problem list     Review of Systems   Constitutional: Positive for fatigue  HENT: Negative  Eyes: Negative  Respiratory: Negative  Cardiovascular: Negative  Gastrointestinal: Positive for abdominal distention and anal bleeding  Endocrine: Negative  Genitourinary: Negative  Musculoskeletal: Negative  Allergic/Immunologic: Negative  Neurological: Negative  Hematological: Negative  Psychiatric/Behavioral: Negative  All other systems reviewed and are negative  Objective:      /74   Pulse 67   Temp 98 4 °F (36 9 °C)   Wt 99 3 kg (219 lb)   SpO2 98%   BMI 34 30 kg/m²          Physical Exam  Constitutional:       Appearance: He is well-developed  HENT:      Head: Atraumatic  Right Ear: External ear normal       Left Ear: External ear normal    Eyes:      Conjunctiva/sclera: Conjunctivae normal       Pupils: Pupils are equal, round, and reactive to light  Neck:      Musculoskeletal: Normal range of motion  Pulmonary:      Effort: Pulmonary effort is normal  No respiratory distress  Abdominal:      General: There is no distension  Musculoskeletal: Normal range of motion  Skin:     General: Skin is warm and dry  Neurological:      Mental Status: He is alert and oriented to person, place, and time  Cranial Nerves: No cranial nerve deficit  Psychiatric:         Behavior: Behavior normal          Thought Content: Thought content normal          Judgment: Judgment normal            BMI Counseling: Body mass index is 34 3 kg/m²  The BMI is above normal  Nutrition recommendations include encouraging healthy choices of fruits and vegetables  Exercise recommendations include moderate physical activity 150 minutes/week

## 2020-09-01 ENCOUNTER — TELEPHONE (OUTPATIENT)
Dept: FAMILY MEDICINE CLINIC | Facility: CLINIC | Age: 40
End: 2020-09-01

## 2020-09-01 DIAGNOSIS — R35.1 NOCTURIA: Primary | ICD-10-CM

## 2020-09-01 RX ORDER — MIRABEGRON 25 MG/1
25 TABLET, FILM COATED, EXTENDED RELEASE ORAL
Qty: 30 TABLET | Refills: 0 | Status: SHIPPED | OUTPATIENT
Start: 2020-09-01

## 2020-09-01 NOTE — TELEPHONE ENCOUNTER
Patient was in for an office visit & he thought he was getting a script over the counter for his prostate problems    Please Advise

## 2020-09-09 PROBLEM — K64.8 INTERNAL HEMORRHOID, BLEEDING: Status: ACTIVE | Noted: 2020-09-09

## 2020-09-09 PROBLEM — K62.5 RECTAL BLEEDING: Status: ACTIVE | Noted: 2020-09-09

## 2020-09-24 ENCOUNTER — NURSE TRIAGE (OUTPATIENT)
Dept: OTHER | Facility: OTHER | Age: 40
End: 2020-09-24

## 2020-09-24 ENCOUNTER — TELEPHONE (OUTPATIENT)
Dept: FAMILY MEDICINE CLINIC | Facility: CLINIC | Age: 40
End: 2020-09-24

## 2020-09-24 DIAGNOSIS — Z20.828 SARS-ASSOCIATED CORONAVIRUS EXPOSURE: ICD-10-CM

## 2020-09-24 DIAGNOSIS — Z20.828 SARS-ASSOCIATED CORONAVIRUS EXPOSURE: Primary | ICD-10-CM

## 2020-09-24 DIAGNOSIS — Z20.822 COVID-19 RULED OUT: Primary | ICD-10-CM

## 2020-09-24 PROCEDURE — U0003 INFECTIOUS AGENT DETECTION BY NUCLEIC ACID (DNA OR RNA); SEVERE ACUTE RESPIRATORY SYNDROME CORONAVIRUS 2 (SARS-COV-2) (CORONAVIRUS DISEASE [COVID-19]), AMPLIFIED PROBE TECHNIQUE, MAKING USE OF HIGH THROUGHPUT TECHNOLOGIES AS DESCRIBED BY CMS-2020-01-R: HCPCS | Performed by: FAMILY MEDICINE

## 2020-09-24 NOTE — TELEPHONE ENCOUNTER
Regarding: BISFK-05 Asymtomatic  ----- Message from Reese Sherwood sent at 9/24/2020 11:36 AM EDT -----  "He was exposed to someone who has COVID-19 and is unable to return to work until he is cleared "

## 2020-09-24 NOTE — TELEPHONE ENCOUNTER
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

## 2020-09-24 NOTE — TELEPHONE ENCOUNTER
Reason for Disposition   [1] COVID-19 EXPOSURE (Close Contact) AND [2] within last 14 days BUT [2] NO symptoms    Answer Assessment - Initial Assessment Questions  1  CLOSE CONTACT: "Who is the person with the confirmed or suspected COVID-19 infection that you were exposed to?"      Exposed to a coworker who tested positive for COVID-19   2  PLACE of CONTACT: "Where were you when you were exposed to COVID-19?" (e g , home, school, medical waiting room; which city?)      He works at [employer]  3  TYPE of CONTACT: "How much contact was there?" (e g , sitting next to, live in same house, work in same office, same building)      Unknown  4  DURATION of CONTACT: "How long were you in contact with the COVID-19 patient?" (e g , a few seconds, passed by person, a few minutes, live with the patient)      Unknown  5  DATE of CONTACT: "When did you have contact with a COVID-19 patient?" (e g , how many days ago)      9/23/2020  6  TRAVEL: "Have you traveled out of the country recently?" If so, "When and where?"      * Also ask about out-of-state travel, since the CDC has identified some high-risk cities for community spread in the [address]  * Note: Travel becomes less relevant if there is widespread community transmission where the patient lives  Denied  7  COMMUNITY SPREAD: "Are there lots of cases of COVID-19 (community spread) where you live?" (See public health department website, if unsure)        Lives in [place], [place]  8  SYMPTOMS: "Do you have any symptoms?" (e g , fever, cough, breathing difficulty)      Asymptomatic  9  PREGNANCY OR POSTPARTUM: "Is there any chance you are pregnant?" "When was your last menstrual period?" "Did you deliver in the last 2 weeks?"      N/A  10  HIGH RISK: "Do you have any heart or lung problems?  Do you have a weak immune system?" (e g , CHF, COPD, asthma, HIV positive, chemotherapy, renal failure, diabetes mellitus, sickle cell anemia)        Denied    Protocols used: CORONAVIRUS [phone]

## 2020-09-25 LAB — SARS-COV-2 RNA SPEC QL NAA+PROBE: NOT DETECTED

## 2020-09-29 ENCOUNTER — OFFICE VISIT (OUTPATIENT)
Dept: FAMILY MEDICINE CLINIC | Facility: CLINIC | Age: 40
End: 2020-09-29
Payer: COMMERCIAL

## 2020-09-29 VITALS
HEART RATE: 70 BPM | HEIGHT: 67 IN | WEIGHT: 213 LBS | SYSTOLIC BLOOD PRESSURE: 120 MMHG | DIASTOLIC BLOOD PRESSURE: 70 MMHG | BODY MASS INDEX: 33.43 KG/M2 | TEMPERATURE: 97.7 F | OXYGEN SATURATION: 98 %

## 2020-09-29 DIAGNOSIS — Z23 ENCOUNTER FOR IMMUNIZATION: ICD-10-CM

## 2020-09-29 DIAGNOSIS — Z00.00 ROUTINE ADULT HEALTH MAINTENANCE: Primary | ICD-10-CM

## 2020-09-29 PROCEDURE — 90686 IIV4 VACC NO PRSV 0.5 ML IM: CPT

## 2020-09-29 PROCEDURE — 90471 IMMUNIZATION ADMIN: CPT

## 2020-09-29 PROCEDURE — 99396 PREV VISIT EST AGE 40-64: CPT | Performed by: FAMILY MEDICINE

## 2020-09-30 PROBLEM — T31.0 BURNS INVOLVING LESS THAN 10% OF BODY SURFACE: Status: ACTIVE | Noted: 2018-02-14

## 2020-09-30 PROBLEM — T21.24XA SECOND DEGREE BURN OF BACK: Status: ACTIVE | Noted: 2018-02-23

## 2020-09-30 PROBLEM — K40.90 UNILATERAL INGUINAL HERNIA WITHOUT OBSTRUCTION OR GANGRENE: Status: ACTIVE | Noted: 2017-08-16

## 2020-09-30 NOTE — PROGRESS NOTES
Assessment/Plan:    70-year-old male with: Annual well visit  Discussed various safety and health maintenance issues including healthy diet like the Mediterranean diet exercise healthy weight as tolerated ample sleep and stress reduction strategies  Discussed supportive care return parameters  No problem-specific Assessment & Plan notes found for this encounter  Diagnoses and all orders for this visit:    Routine adult health maintenance    Encounter for immunization  -     influenza vaccine, quadrivalent, 0 5 mL, preservative-free          Subjective:     Chief Complaint   Patient presents with    Physical Exam     tiffanie eugene        Patient ID: Jorge Machado is a 36 y o  male  Patient is a 70-year-old male who presents for an annual well visit he admits being physically active in generally eats healthy diet  He sleeps well no other health maintenance complaints      The following portions of the patient's history were reviewed and updated as appropriate: allergies, current medications, past family history, past medical history, past social history, past surgical history and problem list     Review of Systems   Constitutional: Negative  HENT: Negative  Eyes: Negative  Respiratory: Negative  Cardiovascular: Negative  Gastrointestinal: Negative  Endocrine: Negative  Genitourinary: Negative  Musculoskeletal: Negative  Allergic/Immunologic: Negative  Neurological: Negative  Hematological: Negative  Psychiatric/Behavioral: Negative  All other systems reviewed and are negative  Objective:      /70   Pulse 70   Temp 97 7 °F (36 5 °C)   Ht 5' 7" (1 702 m)   Wt 96 6 kg (213 lb)   SpO2 98%   BMI 33 36 kg/m²          Physical Exam  Constitutional:       Appearance: He is well-developed  HENT:      Head: Atraumatic        Right Ear: External ear normal       Left Ear: External ear normal    Eyes:      Conjunctiva/sclera: Conjunctivae normal  Pupils: Pupils are equal, round, and reactive to light  Neck:      Musculoskeletal: Normal range of motion  Cardiovascular:      Rate and Rhythm: Normal rate and regular rhythm  Heart sounds: Normal heart sounds  Pulmonary:      Effort: Pulmonary effort is normal  No respiratory distress  Breath sounds: Normal breath sounds  Abdominal:      General: Bowel sounds are normal  There is no distension  Palpations: Abdomen is soft  Tenderness: There is no abdominal tenderness  There is no guarding or rebound  Musculoskeletal: Normal range of motion  Skin:     General: Skin is warm and dry  Neurological:      Mental Status: He is alert and oriented to person, place, and time  Cranial Nerves: No cranial nerve deficit  Psychiatric:         Behavior: Behavior normal          Thought Content: Thought content normal          Judgment: Judgment normal            BMI Counseling: Body mass index is 33 36 kg/m²  The BMI is above normal  Nutrition recommendations include encouraging healthy choices of fruits and vegetables  Exercise recommendations include moderate physical activity 150 minutes/week

## 2020-10-19 ENCOUNTER — OFFICE VISIT (OUTPATIENT)
Dept: SLEEP CENTER | Facility: CLINIC | Age: 40
End: 2020-10-19
Payer: COMMERCIAL

## 2020-10-19 ENCOUNTER — TELEPHONE (OUTPATIENT)
Dept: SLEEP CENTER | Facility: CLINIC | Age: 40
End: 2020-10-19

## 2020-10-19 ENCOUNTER — OFFICE VISIT (OUTPATIENT)
Dept: FAMILY MEDICINE CLINIC | Facility: CLINIC | Age: 40
End: 2020-10-19
Payer: COMMERCIAL

## 2020-10-19 VITALS
TEMPERATURE: 97.4 F | BODY MASS INDEX: 32.65 KG/M2 | HEIGHT: 67 IN | WEIGHT: 208 LBS | SYSTOLIC BLOOD PRESSURE: 122 MMHG | DIASTOLIC BLOOD PRESSURE: 82 MMHG

## 2020-10-19 VITALS
BODY MASS INDEX: 33.68 KG/M2 | TEMPERATURE: 98.1 F | OXYGEN SATURATION: 97 % | WEIGHT: 214.6 LBS | DIASTOLIC BLOOD PRESSURE: 78 MMHG | HEART RATE: 78 BPM | HEIGHT: 67 IN | SYSTOLIC BLOOD PRESSURE: 124 MMHG

## 2020-10-19 DIAGNOSIS — G47.19 EXCESSIVE DAYTIME SLEEPINESS: ICD-10-CM

## 2020-10-19 DIAGNOSIS — G47.33 OSA (OBSTRUCTIVE SLEEP APNEA): Primary | ICD-10-CM

## 2020-10-19 DIAGNOSIS — E66.09 CLASS 1 OBESITY DUE TO EXCESS CALORIES WITH SERIOUS COMORBIDITY AND BODY MASS INDEX (BMI) OF 33.0 TO 33.9 IN ADULT: ICD-10-CM

## 2020-10-19 DIAGNOSIS — T63.441A BEE STING REACTION, ACCIDENTAL OR UNINTENTIONAL, INITIAL ENCOUNTER: Primary | ICD-10-CM

## 2020-10-19 DIAGNOSIS — F51.04 CHRONIC INSOMNIA: ICD-10-CM

## 2020-10-19 PROCEDURE — 99214 OFFICE O/P EST MOD 30 MIN: CPT | Performed by: FAMILY MEDICINE

## 2020-10-19 PROCEDURE — 99204 OFFICE O/P NEW MOD 45 MIN: CPT | Performed by: PSYCHIATRY & NEUROLOGY

## 2020-10-19 RX ORDER — EPINEPHRINE 0.3 MG/.3ML
0.3 INJECTION SUBCUTANEOUS ONCE
Qty: 0.6 ML | Refills: 0 | Status: SHIPPED | OUTPATIENT
Start: 2020-10-19 | End: 2022-06-28 | Stop reason: SDUPTHER

## 2020-10-19 RX ORDER — METHYLPREDNISOLONE ACETATE 40 MG/ML
40 INJECTION, SUSPENSION INTRA-ARTICULAR; INTRALESIONAL; INTRAMUSCULAR; SOFT TISSUE ONCE
Status: COMPLETED | OUTPATIENT
Start: 2020-10-19 | End: 2020-10-19

## 2020-10-19 RX ORDER — ZOLPIDEM TARTRATE 5 MG/1
TABLET ORAL
Qty: 2 TABLET | Refills: 0 | Status: SHIPPED | OUTPATIENT
Start: 2020-10-19

## 2020-10-19 RX ADMIN — METHYLPREDNISOLONE ACETATE 40 MG: 40 INJECTION, SUSPENSION INTRA-ARTICULAR; INTRALESIONAL; INTRAMUSCULAR; SOFT TISSUE at 11:36

## 2020-10-26 ENCOUNTER — NURSE TRIAGE (OUTPATIENT)
Dept: OTHER | Facility: OTHER | Age: 40
End: 2020-10-26

## 2020-12-03 ENCOUNTER — TELEPHONE (OUTPATIENT)
Dept: FAMILY MEDICINE CLINIC | Facility: CLINIC | Age: 40
End: 2020-12-03

## 2020-12-03 DIAGNOSIS — N32.81 OAB (OVERACTIVE BLADDER): Primary | ICD-10-CM

## 2020-12-03 DIAGNOSIS — G47.00 INSOMNIA, UNSPECIFIED TYPE: ICD-10-CM

## 2020-12-03 RX ORDER — HYDROXYZINE HYDROCHLORIDE 25 MG/1
25 TABLET, FILM COATED ORAL
Qty: 30 TABLET | Refills: 0 | Status: SHIPPED | OUTPATIENT
Start: 2020-12-03

## 2021-01-18 ENCOUNTER — HOSPITAL ENCOUNTER (OUTPATIENT)
Dept: SLEEP CENTER | Facility: CLINIC | Age: 41
Discharge: HOME | End: 2021-01-18
Payer: COMMERCIAL

## 2021-01-18 DIAGNOSIS — G47.19 EXCESSIVE DAYTIME SLEEPINESS: ICD-10-CM

## 2021-01-18 DIAGNOSIS — G47.33 OSA (OBSTRUCTIVE SLEEP APNEA): ICD-10-CM

## 2021-01-18 DIAGNOSIS — F51.04 CHRONIC INSOMNIA: ICD-10-CM

## 2021-01-18 PROCEDURE — 95810 POLYSOM 6/> YRS 4/> PARAM: CPT

## 2021-01-19 ENCOUNTER — TELEPHONE (OUTPATIENT)
Dept: SLEEP CENTER | Facility: CLINIC | Age: 41
End: 2021-01-19

## 2021-01-19 NOTE — PROGRESS NOTES
Sleep Study Documentation    Pre-Sleep Study       Sleep testing procedure explained to patient:YES    Patient napped prior to study:NO    Caffeine:Dayshift worker after 12PM   Caffeine use:YES- coffee  6 ounces    Alcohol:Dayshift workers after 5PM: Alcohol use:NO    Typical day for patient:YES       Study Documentation    Sleep Study Indications: Daytime sleepiness, RLS, chronic  insomnia, obesity, loud snoring,     Sleep Study: Diagnostic   Snore: Moderate  Supplemental O2: no      Minimum SaO2 84  Baseline SaO2 93            EKG abnormalities: no     EEG abnormalities: no    Sleep Study Recorded < 2 hours: N/A    Sleep Study Recorded > 2 hours but incomplete study: N/A    Sleep Study Recorded 6 hours but no sleep obtained: NO    Patient classification: employed       Post-Sleep Study    Medication used at bedtime or during sleep study:YES prescription sleep aid    Patient reports time it took to fall asleep:30 to 60 minutes    Patient reports waking up during study:1 to 2 times  Patient reports returning to sleep in 10 to 30 minutes  Patient reports sleeping 2 to 4 hours without dreaming  Patient reports sleep during study:typical    Patient rated sleepiness: Somewhat sleepy or tired    PAP treatment:no

## 2021-01-19 NOTE — TELEPHONE ENCOUNTER
I spoke with patient, advised sleep study does not reveal OFE  Will cancel titration study scheduled for 2/1/2021, advised will not need to go for COVID test    Will keep follow up with Dr Pina Catching 2/15/2021 to discuss further

## 2021-01-25 DIAGNOSIS — Z20.822 CLOSE EXPOSURE TO COVID-19 VIRUS: Primary | ICD-10-CM

## 2021-03-30 ENCOUNTER — OFFICE VISIT (OUTPATIENT)
Dept: FAMILY MEDICINE CLINIC | Facility: CLINIC | Age: 41
End: 2021-03-30
Payer: COMMERCIAL

## 2021-03-30 VITALS
HEART RATE: 80 BPM | DIASTOLIC BLOOD PRESSURE: 80 MMHG | WEIGHT: 220 LBS | BODY MASS INDEX: 34.46 KG/M2 | TEMPERATURE: 97.8 F | OXYGEN SATURATION: 99 % | SYSTOLIC BLOOD PRESSURE: 122 MMHG

## 2021-03-30 DIAGNOSIS — R73.01 IFG (IMPAIRED FASTING GLUCOSE): ICD-10-CM

## 2021-03-30 DIAGNOSIS — I10 BENIGN ESSENTIAL HYPERTENSION: Primary | ICD-10-CM

## 2021-03-30 DIAGNOSIS — E66.9 OBESITY (BMI 30-39.9): ICD-10-CM

## 2021-03-30 DIAGNOSIS — E78.5 BORDERLINE HYPERLIPIDEMIA: ICD-10-CM

## 2021-03-30 PROCEDURE — 99214 OFFICE O/P EST MOD 30 MIN: CPT | Performed by: FAMILY MEDICINE

## 2021-04-01 PROBLEM — E66.9 OBESITY (BMI 30-39.9): Status: ACTIVE | Noted: 2021-04-01

## 2021-04-01 NOTE — PROGRESS NOTES
Assessment/Plan:     80-year-old male with: hypertension hyperlipidemia impaired fasting glucose and obesity  Discussed workup and treatment options with risks and benefits will continue current medications will check fasting labs discussed supportive care return parameters follow-up in 6 months    No problem-specific Assessment & Plan notes found for this encounter  Diagnoses and all orders for this visit:    Benign essential hypertension  -     CBC and differential; Future  -     Comprehensive metabolic panel; Future  -     TSH, 3rd generation with Free T4 reflex; Future  -     Lipid Panel with Direct LDL reflex; Future  -     Hemoglobin A1C; Future  -     Microalbumin / creatinine urine ratio  -     UA (URINE) with reflex to Scope    Borderline hyperlipidemia  -     CBC and differential; Future  -     Comprehensive metabolic panel; Future  -     TSH, 3rd generation with Free T4 reflex; Future  -     Lipid Panel with Direct LDL reflex; Future  -     Hemoglobin A1C; Future  -     Microalbumin / creatinine urine ratio  -     UA (URINE) with reflex to Scope    IFG (impaired fasting glucose)  -     CBC and differential; Future  -     Comprehensive metabolic panel; Future  -     TSH, 3rd generation with Free T4 reflex; Future  -     Lipid Panel with Direct LDL reflex; Future  -     Hemoglobin A1C; Future  -     Microalbumin / creatinine urine ratio  -     UA (URINE) with reflex to Scope    Obesity (BMI 30-39  9)          Subjective:     Chief Complaint   Patient presents with    Follow-up     6 months, no concerns        Patient ID: Arvind Dubon is a 36 y o  male       Patient is a 80-year-old male presents for follow-up on hypertension hyperlipidemia impaired fasting glucose and obesity admits being stable medications denies acute complaints no fevers chills nausea vomiting tolerating p o  intake no other complaints at this time      The following portions of the patient's history were reviewed and updated as appropriate: allergies, current medications, past family history, past medical history, past social history, past surgical history and problem list     Review of Systems   Constitutional: Negative  HENT: Negative  Eyes: Negative  Respiratory: Negative  Cardiovascular: Negative  Gastrointestinal: Negative  Endocrine: Negative  Genitourinary: Negative  Musculoskeletal: Negative  Allergic/Immunologic: Negative  Neurological: Negative  Hematological: Negative  Psychiatric/Behavioral: Negative  All other systems reviewed and are negative  Objective:      /80   Pulse 80   Temp 97 8 °F (36 6 °C)   Wt 99 8 kg (220 lb)   SpO2 99%   BMI 34 46 kg/m²          Physical Exam  Constitutional:       Appearance: He is well-developed  HENT:      Head: Atraumatic  Right Ear: External ear normal       Left Ear: External ear normal    Eyes:      Conjunctiva/sclera: Conjunctivae normal       Pupils: Pupils are equal, round, and reactive to light  Neck:      Musculoskeletal: Normal range of motion  Cardiovascular:      Rate and Rhythm: Normal rate and regular rhythm  Heart sounds: Normal heart sounds  Pulmonary:      Effort: Pulmonary effort is normal  No respiratory distress  Breath sounds: Normal breath sounds  Abdominal:      General: There is no distension  Palpations: Abdomen is soft  Tenderness: There is no abdominal tenderness  There is no guarding or rebound  Musculoskeletal: Normal range of motion  Skin:     General: Skin is warm and dry  Neurological:      Mental Status: He is alert and oriented to person, place, and time  Cranial Nerves: No cranial nerve deficit  Psychiatric:         Behavior: Behavior normal          Thought Content: Thought content normal          Judgment: Judgment normal          BMI Counseling: Body mass index is 34 46 kg/m²   The BMI is above normal  Nutrition recommendations include encouraging healthy choices of fruits and vegetables  Exercise recommendations include moderate physical activity 150 minutes/week

## 2021-06-26 ENCOUNTER — APPOINTMENT (OUTPATIENT)
Dept: RADIOLOGY | Age: 41
End: 2021-06-26

## 2021-06-26 ENCOUNTER — OFFICE VISIT (OUTPATIENT)
Dept: URGENT CARE | Age: 41
End: 2021-06-26

## 2021-06-26 VITALS
SYSTOLIC BLOOD PRESSURE: 130 MMHG | RESPIRATION RATE: 20 BRPM | TEMPERATURE: 98.5 F | DIASTOLIC BLOOD PRESSURE: 86 MMHG | BODY MASS INDEX: 36.65 KG/M2 | WEIGHT: 220 LBS | HEIGHT: 65 IN | HEART RATE: 62 BPM | OXYGEN SATURATION: 97 %

## 2021-06-26 DIAGNOSIS — L23.7 POISON IVY: ICD-10-CM

## 2021-06-26 DIAGNOSIS — T14.90XA INJURY: ICD-10-CM

## 2021-06-26 DIAGNOSIS — S92.511A CLOSED DISPLACED FRACTURE OF PROXIMAL PHALANX OF LESSER TOE OF RIGHT FOOT, INITIAL ENCOUNTER: Primary | ICD-10-CM

## 2021-06-26 PROCEDURE — G0382 LEV 3 HOSP TYPE B ED VISIT: HCPCS | Performed by: PHYSICIAN ASSISTANT

## 2021-06-26 PROCEDURE — 73660 X-RAY EXAM OF TOE(S): CPT

## 2021-06-26 RX ORDER — ACETAMINOPHEN AND CODEINE PHOSPHATE 300; 30 MG/1; MG/1
1 TABLET ORAL EVERY 4 HOURS PRN
Qty: 15 TABLET | Refills: 0 | Status: SHIPPED | OUTPATIENT
Start: 2021-06-26

## 2021-06-26 RX ORDER — PREDNISONE 50 MG/1
50 TABLET ORAL DAILY
Qty: 5 TABLET | Refills: 0 | Status: SHIPPED | OUTPATIENT
Start: 2021-06-26 | End: 2021-07-01

## 2021-06-26 NOTE — PROGRESS NOTES
[facility]        NAME: Gillian Gary is a 39 y o  male  : 1980    MRN: [id number]  DATE: 2021  TIME: 11:32 AM    Assessment and Plan   Closed displaced fracture of proximal phalanx of lesser toe of right foot, initial encounter [S92 511A]  1  Closed displaced fracture of proximal phalanx of lesser toe of right foot, initial encounter  Cam Boot    acetaminophen-codeine (TYLENOL #3) 300-30 mg per tablet    Ambulatory referral to Podiatry   2  Poison ivy  predniSONE 50 mg tablet         Patient Instructions     Take prednisone as directed until completed  Benadryl as needed for itching  Motrin as needed for additional pain control  Use Tylenol No   3 as needed for more severe pain  Wear a boot as recommended for support and comfort  Follow-up with Podiatry  Follow up with PCP in 3-5 days  Proceed to  ER if symptoms worsen  Chief Complaint     Chief Complaint   Patient presents with    Toe Pain     Right 5th toe injury yesterday, rash began yesterday         History of Present Illness       42-year-old male presents with 2 complaints  First complaint is he was playing with the sinus a running around the house and accidentally struck his right little toe off the corner of the wall  Has having moderate to severe pain since then  Trouble with walking around  Denies any numbness or tingling  Second complaint is itchy rash all over his body  Patient reports came in contact some poison ivy continues to be very itchy all over the body  Has taking Benadryl and some over-the-counter creams without any relief  Denies any shortness of breath cough swelling of lips tongue throat or mouth  Nausea vomiting or diarrhea denies    Toe Pain   The incident occurred 12 to 24 hours ago  The incident occurred at home  The injury mechanism was a direct blow  Pain location: Right 5th toe  The pain is moderate  The pain has been constant since onset   Associated symptoms include an inability to bear weight and a loss of motion  Pertinent negatives include no loss of sensation, muscle weakness, numbness or tingling  He reports no foreign bodies present  Nothing aggravates the symptoms  He has tried nothing for the symptoms  The treatment provided no relief  Rash  This is a new problem  The current episode started yesterday  The problem has been waxing and waning since onset  The rash is diffuse  The rash is characterized by redness, itchiness, swelling and blistering  He was exposed to plant contact  Pertinent negatives include no congestion, diarrhea, facial edema, fatigue, fever, shortness of breath, sore throat or vomiting  Past treatments include antihistamine and anti-itch cream  The treatment provided no relief  Review of Systems   Review of Systems   Constitutional: Negative  Negative for fatigue and fever  HENT: Negative  Negative for congestion and sore throat  Eyes: Negative  Respiratory: Negative  Negative for shortness of breath  Cardiovascular: Negative  Gastrointestinal: Negative  Negative for diarrhea and vomiting  Musculoskeletal: Negative  Skin: Positive for rash  Neurological: Negative  Negative for tingling and numbness           Current Medications       Current Outpatient Medications:     EPINEPHrine (EPIPEN) 0 3 mg/0 3 mL SOAJ, Inject 0 3 mL (0 3 mg total) into a muscle once for 1 dose, Disp: 0 6 mL, Rfl: 0    acetaminophen-codeine (TYLENOL #3) 300-30 mg per tablet, Take 1 tablet by mouth every 4 (four) hours as needed for moderate pain, Disp: 15 tablet, Rfl: 0    albuterol (PROVENTIL HFA,VENTOLIN HFA) 90 mcg/act inhaler, Inhale 2 puffs every 6 (six) hours as needed for wheezing or shortness of breath (Patient not taking: Reported on 6/26/2021), Disp: 1 Inhaler, Rfl: 1    fluticasone (FLONASE) 50 mcg/act nasal spray, 2 sprays into each nostril daily (Patient not taking: Reported on 6/26/2021), Disp: 16 g, Rfl: 0    hydrOXYzine HCL (ATARAX) 25 mg tablet, Take 1 tablet (25 mg total) by mouth daily at bedtime as needed (insomnia) (Patient not taking: Reported on 6/26/2021), Disp: 30 tablet, Rfl: 0    Mirabegron ER (Myrbetriq) 25 MG TB24, Take 25 mg by mouth daily at bedtime as needed (prostate symptoms) (Patient not taking: Reported on 6/26/2021), Disp: 30 tablet, Rfl: 0    Mirabegron ER 50 MG TB24, Take 1 tablet (50 mg total) by mouth daily at bedtime as needed (OAB) (Patient not taking: Reported on 6/26/2021), Disp: 90 tablet, Rfl: 1    Multiple Vitamin (MULTIVITAMIN) capsule, Take 1 capsule by mouth daily (Patient not taking: Reported on 6/26/2021), Disp: , Rfl:     predniSONE 50 mg tablet, Take 1 tablet (50 mg total) by mouth daily for 5 days, Disp: 5 tablet, Rfl: 0    simethicone (MYLICON,GAS-X) 744 MG capsule, Take 1 capsule (180 mg total) by mouth every 6 (six) hours as needed (abdominal cramping) (Patient not taking: Reported on 6/26/2021), Disp: 30 capsule, Rfl: 1    zolpidem (AMBIEN) 5 mg tablet, Bring to sleep study  If unable to sleep in 30 minutes take 1 pill  If unable to sleep 30 minutes after 1st dose take 2nd pill  (Patient not taking: Reported on 6/26/2021), Disp: 2 tablet, Rfl: 0    Current Allergies     Allergies as of 06/26/2021    (No Known Allergies)            The following portions of the patient's history were reviewed and updated as appropriate: allergies, current medications, past family history, past medical history, past social history, past surgical history and problem list      Past Medical History:   Diagnosis Date    Hypertension     Last assessed 2/21/2017        Past Surgical History:   Procedure Laterality Date    HERNIA REPAIR      KNEE SURGERY      Last assessed 2/21/2017        Family History   Problem Relation Age of Onset    Heart attack Father     Hypertension Father     Breast cancer Family     Breast cancer Family     Colon cancer Neg Hx          Medications have been verified          Objective   /86 (BP Location: Right arm, Patient Position: Sitting, Cuff Size: Standard)   Pulse 62   Temp 98 5 °F (36 9 °C) (Temporal)   Resp 20   Ht 5' 5" (1 651 m)   Wt 99 8 kg (220 lb)   SpO2 97%   BMI 36 61 kg/m²   No LMP for male patient  Physical Exam     Physical Exam  Vitals and nursing note reviewed  Constitutional:       General: He is not in acute distress  Appearance: He is well-developed  HENT:      Head: Normocephalic and atraumatic  Right Ear: Hearing, tympanic membrane, ear canal and external ear normal       Left Ear: Hearing, tympanic membrane, ear canal and external ear normal       Nose: Nose normal       Mouth/Throat:      Pharynx: Uvula midline  No oropharyngeal exudate  Eyes:      General:         Right eye: No discharge  Left eye: No discharge  Conjunctiva/sclera: Conjunctivae normal    Cardiovascular:      Rate and Rhythm: Normal rate and regular rhythm  Heart sounds: Normal heart sounds  No murmur heard  Pulmonary:      Effort: Pulmonary effort is normal  No respiratory distress  Breath sounds: Normal breath sounds  No wheezing or rales  Abdominal:      General: Bowel sounds are normal       Palpations: Abdomen is soft  Tenderness: There is no abdominal tenderness  Musculoskeletal:      Cervical back: Normal range of motion and neck supple  Right foot: Decreased range of motion  Normal capillary refill  Swelling, tenderness and bony tenderness present  No deformity, bunion, Charcot foot, foot drop, prominent metatarsal heads, laceration or crepitus  Normal pulse  Feet:    Lymphadenopathy:      Cervical: No cervical adenopathy  Skin:     General: Skin is warm and dry  Findings: Rash (Erythematous rash noted over back and on arms) present  Neurological:      Mental Status: He is alert and oriented to person, place, and time  X-rays reviewed  Fracture noted to proximal phalanx of 5th toe  Displaced      Placed in [facility]ko whitfield

## 2021-06-26 NOTE — PATIENT INSTRUCTIONS
Take prednisone as directed until completed  Benadryl as needed for itching  Motrin as needed for additional pain control  Use Tylenol No   3 as needed for more severe pain  Wear a boot as recommended for support and comfort  Follow-up with Podiatry  Follow up with PCP in 3-5 days  Proceed to  ER if symptoms worsen  Toe Fracture   AMBULATORY CARE:   A toe fracture  is a break in a bone in your toe  Common signs and symptoms include the following:   · Pain, redness, swelling, or bruising    · Not being able to bend or move your toe    · Not being able to walk or put weight on your toe    · Toe is bent at an angle that is not normal    Seek care immediately if:   · Blood soaks through your bandage  · You have severe pain in your toe  · Your toe is cold or numb  Call your doctor if:   · You have a fever  · Your pain does not go away, even after treatment  · Your toe continues to hurt even after it has healed  · You have questions or concerns about your condition or care  Treatment for a toe fracture  may include any of the following:  · Buddy tape, elastic bandage, or a splint  may be used to support your toe in its correct position  Buddy tape means your broken toe and the toe next to it are taped together  · A support device  such as a cane, crutches, walking boot, or hard-soled shoe may be needed  These help protect your toe and limit movement so it can heal          · Medicines  may be given to prevent or treat pain or a bacterial infection  · Closed reduction  is used to move your bones back into place without surgery  · Surgery  may be needed if the bone is out of place or the toe joint is damaged  Wires, pins, or other hardware may be used to keep your bone in place while it heals  Self-care:   · Rest  your toe so that it can heal  Return to normal activities as directed  · Apply ice  on your toe for 15 to 20 minutes every hour or as directed   Use an ice pack, or put crushed ice in a plastic bag  Cover it with a towel before you put it on your toe  Ice helps prevent tissue damage and decreases swelling and pain  · Elevate  your toe above the level of your heart as often as you can  This will help decrease swelling and pain  Prop your toe on pillows or blankets to keep it elevated comfortably  Follow up with your doctor as directed: You may need to return in 2 to 4 weeks  Write down your questions so you remember to ask them during your visits  © Copyright [address] Information is for End User's use only and may not be sold, redistributed or otherwise used for commercial purposes  All illustrations and images included in CareNotes® are the copyrighted property of A D A M , Inc  or [facility] Kathryn Braun   The above information is an  only  It is not intended as medical advice for individual conditions or treatments  Talk to your doctor, nurse or pharmacist before following any medical regimen to see if it is safe and effective for you  Poison Ivy   WHAT YOU NEED TO KNOW:   Poison ivy is a plant that can cause an itchy, uncomfortable rash on your skin  Poison ivy grows as a shrub or vine in woods, fields, and areas of thick [place]  It has 3 bright green leaves on each stem that turn red in daniela  DISCHARGE INSTRUCTIONS:   Medicines:   · Antiseptic or drying creams or ointments: These medicines may be used to dry out the rash and decrease the itching  These products may be available without a doctor's order  · Steroids: This medicine helps decrease itching and inflammation  It can be given as a cream to apply to your skin or as a pill  · Antihistamines: This medicine may help decrease itching and help you sleep  It is available without a doctor's order  · Take your medicine as directed  Contact your healthcare provider if you think your medicine is not helping or if you have side effects   Tell him of her if you are allergic to any medicine  Keep a list of the medicines, vitamins, and herbs you take  Include the amounts, and when and why you take them  Bring the list or the pill bottles to follow-up visits  Carry your medicine list with you in case of an emergency  Follow up with your healthcare provider as directed:  Write down your questions so you remember to ask them during your visits  How your poison ivy rash spreads: You cannot spread poison ivy by touching your rash or the liquid from your blisters  Poison ivy is spread only if you scratch your skin while it still has oil on it  You may think your rash is spreading because new rashes appear over a number of days  This happens because areas covered by thin skin break out in a rash first  Your face or forearms may develop a rash before thicker areas, such as the palms of your hands  Self-care:   · Keep your rash clean and dry:  Wash it with soap and water  Gently pat it dry with a clean towel  · Try not to scratch or rub your rash: This can cause your skin to become infected  · Use a compress on your rash:  Dip a clean washcloth in cool water  Wring it out and place it on your rash  Leave the washcloth on your skin for 15 minutes  Do this at least 3 times per day  · Take a cornstarch or oatmeal bath: If your rash is too large to cover with wet washcloths, take 3 or 4 cornstarch baths daily  Mix 1 pound of cornstarch with a little water to make a paste  Add the paste to a tub full of water and mix well  You may also use colloidal oatmeal in the bath water  Use lukewarm water  Avoid hot water because it may cause your itching to increase  Prevent a poison ivy rash in the future:   · Wear skin protection:  Wear long pants, a long-sleeved shirt, and gloves  Use a skin block lotion to protect your skin from poison ivy oil  You can find this at a drugstore without a prescription  · Wash clothing after possible exposure:   If you think you have been near a poison ivy plant, wash the clothes you were wearing separately from other clothes  Rinse the washing machine well after you take the clothes out  Scrub boots and shoes with warm, soapy water  Dry clean items and clothing that you cannot wash in water  Poison ivy oil is sticky and can stay on surfaces for a long time  It can cause a new rash even years later  · Bathe your pet:  Use warm water and shampoo on your pet's fur  This will prevent the spread of oil to your skin, car, and home  Wear long sleeves, long pants, and gloves while washing pets or any items that may have oil on them  · Reduce exposure to poison ivy:  Do not touch plants that look like poison ivy  Keep your yard free of poison ivy  While protecting your skin, remove the plant and the roots  Place them in a plastic bag and seal the bag tightly  · Do not burn poison ivy plants: This can spread the oil through the air  If you breathe the oil into your lungs, you could have swelling and serious breathing problems  Oil that clings to the fire elayne can land on your skin and cause a rash  Contact your healthcare provider if:   · You have pus, soft yellow scabs, or tenderness on the rash  · The itching gets worse or keeps you awake at night  · The rash covers more than 1/4 of your skin or spreads to your eyes, mouth, or genital area  · The rash is not better after 2 to 3 weeks  · You have tender, swollen glands on the sides of your neck  · You have swelling in your arms and legs  · You have questions or concerns about your condition or care  Return to the emergency department if:   · You have a fever  · You have redness, swelling, and tenderness around the rash  · You have trouble breathing  © Copyright [address] Information is for End User's use only and may not be sold, redistributed or otherwise used for commercial purposes   All illustrations and images included in CareNotes® are the copyrighted property of A D A M , Inc  or [facility] Kathryn Braun   The above information is an  only  It is not intended as medical advice for individual conditions or treatments  Talk to your doctor, nurse or pharmacist before following any medical regimen to see if it is safe and effective for you

## 2021-07-02 ENCOUNTER — HOSPITAL ENCOUNTER (EMERGENCY)
Facility: HOSPITAL | Age: 41
Discharge: HOME | End: 2021-07-02
Attending: EMERGENCY MEDICINE
Payer: COMMERCIAL

## 2021-07-02 VITALS
HEART RATE: 71 BPM | RESPIRATION RATE: 18 BRPM | DIASTOLIC BLOOD PRESSURE: 91 MMHG | SYSTOLIC BLOOD PRESSURE: 142 MMHG | TEMPERATURE: 98.2 F | WEIGHT: 221.12 LBS | BODY MASS INDEX: 36.8 KG/M2 | OXYGEN SATURATION: 97 %

## 2021-07-02 DIAGNOSIS — K29.70 GASTRITIS: ICD-10-CM

## 2021-07-02 DIAGNOSIS — L23.7 CONTACT DERMATITIS DUE TO POISON IVY: Primary | ICD-10-CM

## 2021-07-02 PROCEDURE — 99282 EMERGENCY DEPT VISIT SF MDM: CPT

## 2021-07-02 PROCEDURE — 99284 EMERGENCY DEPT VISIT MOD MDM: CPT | Performed by: EMERGENCY MEDICINE

## 2021-07-02 RX ORDER — FAMOTIDINE 20 MG/1
20 TABLET, FILM COATED ORAL 2 TIMES DAILY
Qty: 30 TABLET | Refills: 0 | Status: SHIPPED | OUTPATIENT
Start: 2021-07-02

## 2021-07-02 RX ORDER — PREDNISONE 10 MG/1
TABLET ORAL
Qty: 60 TABLET | Refills: 0 | Status: SHIPPED | OUTPATIENT
Start: 2021-07-02 | End: 2021-07-22

## 2021-07-02 RX ORDER — PREDNISONE 20 MG/1
60 TABLET ORAL ONCE
Status: COMPLETED | OUTPATIENT
Start: 2021-07-02 | End: 2021-07-02

## 2021-07-02 RX ADMIN — PREDNISONE 60 MG: 20 TABLET ORAL at 20:34

## 2021-07-03 NOTE — DISCHARGE INSTRUCTIONS
Patient Instructions: Today you were seen in the emergency department for rash due to poison ivy  We examined you and determined that you would be able to be discharged  You should take steroids for the next 3 weeks as written  Take the pepcid to help wit hthe stomach upset from the steroids       You should follow up with your primary care doctor  Please return to the emergency department if your symptoms get worse, you develop shortness of breath, you develop a fever, or you have any other symptoms we discussed today prior to discharge  Nice to meet you! Best of luck with everything!

## 2021-07-03 NOTE — ED ATTENDING ATTESTATION
7/2/2021  I, Carrol Frye MD, saw and evaluated the patient  I have discussed the patient with the resident/non-physician practitioner and agree with the resident's/non-physician practitioner's findings, Plan of Care, and MDM as documented in the resident's/non-physician practitioner's note, except where noted  All available labs and Radiology studies were reviewed  I was present for key portions of any procedure(s) performed by the resident/non-physician practitioner and I was immediately available to provide assistance  At this point I agree with the current assessment done in the Emergency Department  I have conducted an independent evaluation of this patient a history and physical is as follows:  Patient with exposure tp poison IV, was treated with 5 day steroids, felt better but the rash came, c/o rash to back, chest, itchy eye, no oropharyngeal involvement, no dyspnea  On exam patient is conscious, alert, vital signs stable, speaking full sentences, no oropharyngeal swelling, no lip swelling, no tongue swelling, no respiratory distress, rash noted over chest   Impression:  Recurrent Poison IV, will give 3 weeks of Prednisone, pepcid      ED Course         Critical Care Time  Procedures

## 2021-07-09 NOTE — ED PROVIDER NOTES
Final Diagnosis:  1  Contact dermatitis due to poison ivy    2  Gastritis         Chief Complaint   Patient presents with   [facility]     pt  reports poisin ivy for 2 weeks  pt  reportws arms, legs, back  pt  reports took steroid but still is not improving  ASSESSMENT + PLAN:   - Nursing note reviewed  1  Contact dermatitis, rebound  -3 weeks prednisone taper  -Pepcid given for prolonged steroid course  -told to return if he has any oral involvement      Final Dispo   Patient was reassessed  Vital signs stable  Patient and/or family given discharge instructions and return precautions  Patient and/or family was reassured  The patient and/or family vocalizes understanding  Answered all of the patient's and/or family's questions  Will follow up with PCP  Patient and/or family are agreeable to the plan  Medications   predniSONE tablet 60 mg (60 mg Oral Given 7/2/21 2034)     Time reflects when diagnosis was documented in both MDM as applicable and the Disposition within this note     Time User Action Codes Description Comment    7/2/2021  8:20 PM Everton Swenson Add [L23 7] Contact dermatitis due to poison ivy     7/2/2021  8:20 PM Everton Swenson Modify [L23 7] Contact dermatitis due to poison ivy Rebound    7/2/2021  8:25 PM Ashlee Swenson Add [K92 2] GI (gastrointestinal bleed)     7/2/2021  8:25 PM Sharon Hudson Add [K29 70] Gastritis     7/2/2021  8:25 PM Ashlee Swenson Remove [K92 2] GI (gastrointestinal bleed)     7/2/2021  8:25 PM Sharon Hudson Modify [K29 70] Gastritis Preventative      ED Disposition     ED Disposition Condition Date/Time Comment    Discharge Stable Fri Jul 2, 2021  8:20 PM [facility] discharge to home/self care              Follow-up Information     Follow up With Specialties Details Why Contact Info    Eder Mcknight MD [facility] Medicine Call   [address] [facility]  [phone]          Discharge Medication List as of 7/2/2021  8:26 PM      START taking these medications    Details   famotidine (PEPCID) 20 mg tablet Take 1 tablet (20 mg total) by mouth 2 (two) times a day, Starting Fri 7/2/2021, Normal         CONTINUE these medications which have CHANGED    Details   predniSONE 10 mg tablet Multiple Dosages:Starting Fri 7/2/2021, Until Mon 7/5/2021 at 2359, THEN Starting Tue 7/6/2021, Until Fri 7/9/2021 at 2359, THEN Starting Sat 7/10/2021, Until Tue 7/13/2021 at 2359, THEN Starting Wed 7/14/2021, Until Sat 7/17/2021 at 2359, THEN Star ting Sun 7/18/2021, Until Wed 7/21/2021 at 2359Take 5 tablets (50 mg total) by mouth daily for 4 days, THEN 4 tablets (40 mg total) daily for 4 days, THEN 3 tablets (30 mg total) daily for 4 days, THEN 2 tablets (20 mg total) daily for 4 days, THEN 1 ta blet (10 mg total) daily for 4 days  , Normal         CONTINUE these medications which have NOT CHANGED    Details   acetaminophen-codeine (TYLENOL #3) 300-30 mg per tablet Take 1 tablet by mouth every 4 (four) hours as needed for moderate pain, Starting Sat 6/26/2021, Normal      albuterol (PROVENTIL HFA,VENTOLIN HFA) 90 mcg/act inhaler Inhale 2 puffs every 6 (six) hours as needed for wheezing or shortness of breath, Starting Fri 1/24/2020, Normal      EPINEPHrine (EPIPEN) 0 3 mg/0 3 mL SOAJ Inject 0 3 mL (0 3 mg total) into a muscle once for 1 dose, Starting Mon 10/19/2020, Normal      fluticasone (FLONASE) 50 mcg/act nasal spray 2 sprays into each nostril daily, Starting Wed 12/5/2018, Normal      hydrOXYzine HCL (ATARAX) 25 mg tablet Take 1 tablet (25 mg total) by mouth daily at bedtime as needed (insomnia), Starting Thu 12/3/2020, Normal      !! Mirabegron ER (Myrbetriq) 25 MG TB24 Take 25 mg by mouth daily at bedtime as needed (prostate symptoms), Starting Tue 9/1/2020, Normal      !!  Mirabegron ER 50 MG TB24 Take 1 tablet (50 mg total) by mouth daily at bedtime as needed (OAB), Starting Thu 12/3/2020, Normal      Multiple Vitamin (MULTIVITAMIN) capsule Take 1 capsule by mouth daily, Historical Med      simethicone (MYLICON,GAS-X) 175 MG capsule Take 1 capsule (180 mg total) by mouth every 6 (six) hours as needed (abdominal cramping), Starting 2020, Normal      zolpidem (AMBIEN) 5 mg tablet Bring to sleep study  If unable to sleep in 30 minutes take 1 pill  If unable to sleep 30 minutes after 1st dose take 2nd pill , Normal       !! - Potential duplicate medications found  Please discuss with provider  No discharge procedures on file  Prior to Admission Medications   Prescriptions Last Dose Informant Patient Reported? Taking?    EPINEPHrine (EPIPEN) 0 3 mg/0 3 mL SOAJ   No No   Sig: Inject 0 3 mL (0 3 mg total) into a muscle once for 1 dose   Mirabegron ER (Myrbetriq) 25 MG TB24   No No   Sig: Take 25 mg by mouth daily at bedtime as needed (prostate symptoms)   Patient not taking: Reported on 2021   Mirabegron ER 50 MG TB24   No No   Sig: Take 1 tablet (50 mg total) by mouth daily at bedtime as needed (OAB)   Patient not taking: Reported on 2021   Multiple Vitamin (MULTIVITAMIN) capsule  Self Yes No   Sig: Take 1 capsule by mouth daily   Patient not taking: Reported on 2021   acetaminophen-codeine (TYLENOL #3) 300-30 mg per tablet   No No   Sig: Take 1 tablet by mouth every 4 (four) hours as needed for moderate pain   albuterol (PROVENTIL HFA,VENTOLIN HFA) 90 mcg/act inhaler   No No   Sig: Inhale 2 puffs every 6 (six) hours as needed for wheezing or shortness of breath   Patient not taking: Reported on 2021   fluticasone (FLONASE) 50 mcg/act nasal spray   No No   Si sprays into each nostril daily   Patient not taking: Reported on 2021   hydrOXYzine HCL (ATARAX) 25 mg tablet   No No   Sig: Take 1 tablet (25 mg total) by mouth daily at bedtime as needed (insomnia)   Patient not taking: Reported on 2021   predniSONE 50 mg tablet   No No   Sig: Take 1 tablet (50 mg total) by mouth daily for 5 days   simethicone (MYLICON,GAS-X) 068 MG capsule No No   Sig: Take 1 capsule (180 mg total) by mouth every 6 (six) hours as needed (abdominal cramping)   Patient not taking: Reported on 6/26/2021   zolpidem (AMBIEN) 5 mg tablet   No No   Sig: Bring to sleep study  If unable to sleep in 30 minutes take 1 pill  If unable to sleep 30 minutes after 1st dose take 2nd pill  Patient not taking: Reported on 6/26/2021      Facility-Administered Medications: None       History of Present Illness: This is a 39 y o  male presenting today for e he we to was treated with 5 days of steroids which did improve his rash  Nevertheless, his rash came back  He does not have any there was airway involvement at this time  Patient does not have any other complaints  No other exposures  No fever, chills, nausea, vomiting, diarrhea, chest pain, shortness of breath  No headaches  Offers no other complaints when asked  - No language barrier    - History obtained from patient and chart   - There are no limitations to the history obtained  - Previous charting was reviewed  Some data reviewed included below for ease of access whether or not it is relevant to this patient encounter  Past Medical, Past Surgical History:    has a past medical history of Hypertension  has a past surgical history that includes Knee surgery and Hernia repair  Allergies:   No Known Allergies    Social and Family History:     Social History     Substance and Sexual Activity   Alcohol Use Yes    Comment: 2-3x/week     Social History     Tobacco Use   Smoking Status Never Smoker   Smokeless Tobacco Never Used     Social History     Substance and Sexual Activity   Drug Use No       Review of Systems:   Review of Systems   Constitutional: Negative  Negative for chills and fever  HENT: Negative  Eyes: Positive for itching  Negative for visual disturbance  Respiratory: Negative  Negative for shortness of breath  Cardiovascular: Negative for chest pain  Gastrointestinal: Negative  Negative for abdominal pain, nausea and vomiting  Genitourinary: Negative  Negative for dysuria  Musculoskeletal: Negative  Negative for myalgias  Skin: Positive for rash  Neurological: Negative  Negative for headaches  Psychiatric/Behavioral: Negative  Negative for self-injury  All other systems reviewed and are negative  Physical Examination     Vitals:    07/02/21 1923 07/02/21 2020   BP: (!) 165/103 142/91   BP Location: Right arm Left arm   Pulse: 69 71   Resp: 20 18   Temp: 98 2 °F (36 8 °C)    TempSrc: Oral    SpO2: 98% 97%   Weight: 100 kg (221 lb 1 9 oz)      Vitals reviewed by me  Physical Exam  Vitals and nursing note reviewed  Constitutional:       General: He is not in acute distress  Appearance: He is not ill-appearing  HENT:      Head: Atraumatic  Comments: Airway intact, normal voice, normal phonation     Right Ear: External ear normal       Left Ear: External ear normal       Nose: Nose normal       Mouth/Throat:      Mouth: Mucous membranes are moist    Eyes:      General: No scleral icterus  Cardiovascular:      Rate and Rhythm: Normal rate  Pulmonary:      Effort: Pulmonary effort is normal  No respiratory distress  Abdominal:      Tenderness: There is no abdominal tenderness  Musculoskeletal:         General: No deformity  Normal range of motion  Skin:     Findings: Rash present  Neurological:      General: No focal deficit present  Mental Status: He is alert  Psychiatric:         Mood and Affect: Mood normal                                       No orders to display     No orders of the defined types were placed in this encounter  Labs:   Labs Reviewed - No data to display    Imaging:   XR toe right fifth min 2 views    Result Date: 6/27/2021  Narrative: RIGHT TOES INDICATION:   T14 90XA: Injury, unspecified, initial encounter   COMPARISON:  None VIEWS:  XR TOE RIGHT FIFTH MIN 2 VIEWS FINDINGS: Right 5th proximal phalanx oblique mildly displaced fracture  No significant degenerative changes  No lytic or blastic osseous lesion  Soft tissues are unremarkable  Impression: Right 5th proximal phalanx fracture  The study was marked in [facility] for immediate notification  Workstation performed: [id number]        Final Diagnosis:  1  Contact dermatitis due to poison ivy    2  Gastritis        Code Status: No Order    Portions of the record may have been created with voice recognition software  Occasional wrong word or "sound a like" substitutions may have occurred due to the inherent limitations of voice recognition software  Read the chart carefully and recognize, using context, where substitutions have occurred      Electronically signed by:  Kerri Delacruz, PGY 2, MD Tomasz Renner MD  07/08/21 1150

## 2021-07-29 ENCOUNTER — OFFICE VISIT (OUTPATIENT)
Dept: FAMILY MEDICINE CLINIC | Facility: CLINIC | Age: 41
End: 2021-07-29
Payer: COMMERCIAL

## 2021-07-29 DIAGNOSIS — B34.9 VIRAL SYNDROME: Primary | ICD-10-CM

## 2021-07-29 DIAGNOSIS — R05.8 COUGH WITH EXPOSURE TO COVID-19 VIRUS: ICD-10-CM

## 2021-07-29 DIAGNOSIS — Z20.822 COUGH WITH EXPOSURE TO COVID-19 VIRUS: ICD-10-CM

## 2021-07-29 PROCEDURE — 99214 OFFICE O/P EST MOD 30 MIN: CPT | Performed by: FAMILY MEDICINE

## 2021-07-29 PROCEDURE — U0003 INFECTIOUS AGENT DETECTION BY NUCLEIC ACID (DNA OR RNA); SEVERE ACUTE RESPIRATORY SYNDROME CORONAVIRUS 2 (SARS-COV-2) (CORONAVIRUS DISEASE [COVID-19]), AMPLIFIED PROBE TECHNIQUE, MAKING USE OF HIGH THROUGHPUT TECHNOLOGIES AS DESCRIBED BY CMS-2020-01-R: HCPCS | Performed by: FAMILY MEDICINE

## 2021-07-29 PROCEDURE — U0005 INFEC AGEN DETEC AMPLI PROBE: HCPCS | Performed by: FAMILY MEDICINE

## 2021-07-29 RX ORDER — METHYLPREDNISOLONE 4 MG/1
TABLET ORAL
Qty: 21 EACH | Refills: 0 | Status: SHIPPED | OUTPATIENT
Start: 2021-07-29

## 2021-07-29 RX ORDER — AMOXICILLIN 500 MG/1
1000 CAPSULE ORAL EVERY 12 HOURS SCHEDULED
Qty: 28 CAPSULE | Refills: 0 | Status: SHIPPED | OUTPATIENT
Start: 2021-07-29 | End: 2021-08-05

## 2021-07-29 NOTE — PROGRESS NOTES
Assessment/Plan:   Recommend supportive care fluids and rest   Self quarantine as per CDC guidelines  Good handwashing technique and hygiene  Social distancing  Will call with swab results  Diagnoses and all orders for this visit:    Viral syndrome  -     methylPREDNISolone 4 MG tablet therapy pack; Use as directed on package  -     amoxicillin (AMOXIL) 500 mg capsule; Take 2 capsules (1,000 mg total) by mouth every 12 (twelve) hours for 7 days    Cough with exposure to COVID-19 virus  -     Novel Coronavirus (COVID-19), PCR [facility] Collected in Office  -     methylPREDNISolone 4 MG tablet therapy pack; Use as directed on package  -     amoxicillin (AMOXIL) 500 mg capsule; Take 2 capsules (1,000 mg total) by mouth every 12 (twelve) hours for 7 days          Subjective:     Patient ID: Ahsan Machuca is a 39 y o  male  Patient presents with:  Cold Like Symptoms: congested , throat pain, eyes and cough for 3 days          Review of Systems   Constitutional: Negative  Negative for fatigue and fever  HENT: Positive for congestion, ear pain, rhinorrhea and sore throat  Eyes: Negative  Respiratory: Positive for cough  Negative for shortness of breath  Cardiovascular: Negative  Gastrointestinal: Negative  Endocrine: Negative  Genitourinary: Negative  Musculoskeletal: Negative  Skin: Negative  Allergic/Immunologic: Negative  Neurological: Negative  Hematological: Negative  Psychiatric/Behavioral: Negative  All other systems reviewed and are negative  Objective:     Physical Exam  Vitals and nursing note reviewed  Constitutional:       Appearance: He is well-developed  HENT:      Head: Normocephalic and atraumatic  Right Ear: External ear normal       Left Ear: External ear normal       Nose: Nose normal       Mouth/Throat:      Mouth: Mucous membranes are moist       Pharynx: Posterior oropharyngeal erythema present  No oropharyngeal exudate     Eyes: Conjunctiva/sclera: Conjunctivae normal       Pupils: Pupils are equal, round, and reactive to light  Cardiovascular:      Rate and Rhythm: Normal rate and regular rhythm  Heart sounds: Normal heart sounds  Pulmonary:      Effort: Pulmonary effort is normal       Breath sounds: Normal breath sounds  Abdominal:      General: Bowel sounds are normal       Palpations: Abdomen is soft  Musculoskeletal:         General: Normal range of motion  Cervical back: Normal range of motion and neck supple  Skin:     General: Skin is warm and dry  Neurological:      Mental Status: He is alert and oriented to person, place, and time  Deep Tendon Reflexes: Reflexes are normal and symmetric     Psychiatric:         Behavior: Behavior normal

## 2021-07-29 NOTE — LETTER
July 29, 2021     Patient: Ryan Norton   YOB: 1980   Date of Visit: 7/29/2021       To Whom it May Concern:    Ryan Norton is under my professional care  He was seen in my office on 7/29/2021  He may return to work on May return to work on Tuesday August 3rd 2021 pending results of his COVID swab       If you have any questions or concerns, please don't hesitate to call           Sincerely,          Dayanna Ulloa DO        CC: No Recipients

## 2021-07-30 LAB — SARS-COV-2 RNA RESP QL NAA+PROBE: NEGATIVE

## 2021-08-02 ENCOUNTER — TELEPHONE (OUTPATIENT)
Dept: FAMILY MEDICINE CLINIC | Facility: CLINIC | Age: 41
End: 2021-08-02

## 2021-08-02 DIAGNOSIS — B34.9 VIRAL SYNDROME: Primary | ICD-10-CM

## 2021-08-02 NOTE — TELEPHONE ENCOUNTER
PATIENT WOULD LIKE AN INHALER CALLED IN TO HELP WITH HIS BREATHING SAID HE IS FEELING BETTER FROM LAST APPT BUT FEELS HE NEEDS THE INHALER

## 2021-08-04 RX ORDER — ALBUTEROL SULFATE 90 UG/1
2 AEROSOL, METERED RESPIRATORY (INHALATION) EVERY 4 HOURS PRN
Qty: 18 G | Refills: 0 | Status: SHIPPED | OUTPATIENT
Start: 2021-08-04 | End: 2022-08-05 | Stop reason: SDUPTHER

## 2021-08-07 ENCOUNTER — APPOINTMENT (OUTPATIENT)
Dept: LAB | Facility: MEDICAL CENTER | Age: 41
End: 2021-08-07
Payer: COMMERCIAL

## 2021-08-07 DIAGNOSIS — R73.01 IFG (IMPAIRED FASTING GLUCOSE): ICD-10-CM

## 2021-08-07 DIAGNOSIS — I10 BENIGN ESSENTIAL HYPERTENSION: ICD-10-CM

## 2021-08-07 DIAGNOSIS — E78.5 BORDERLINE HYPERLIPIDEMIA: ICD-10-CM

## 2021-08-07 LAB
ALBUMIN SERPL BCP-MCNC: 3.4 G/DL (ref 3.5–5)
ALP SERPL-CCNC: 55 U/L (ref 46–116)
ALT SERPL W P-5'-P-CCNC: 50 U/L (ref 12–78)
ANION GAP SERPL CALCULATED.3IONS-SCNC: 6 MMOL/L (ref 4–13)
AST SERPL W P-5'-P-CCNC: 20 U/L (ref 5–45)
BASOPHILS # BLD AUTO: 0.06 THOUSANDS/ΜL (ref 0–0.1)
BASOPHILS NFR BLD AUTO: 1 % (ref 0–1)
BILIRUB SERPL-MCNC: 0.53 MG/DL (ref 0.2–1)
BILIRUB UR QL STRIP: NEGATIVE
BUN SERPL-MCNC: 10 MG/DL (ref 5–25)
CALCIUM ALBUM COR SERPL-MCNC: 9.4 MG/DL (ref 8.3–10.1)
CALCIUM SERPL-MCNC: 8.9 MG/DL (ref 8.3–10.1)
CHLORIDE SERPL-SCNC: 107 MMOL/L (ref 100–108)
CHOLEST SERPL-MCNC: 197 MG/DL (ref 50–200)
CLARITY UR: NORMAL
CO2 SERPL-SCNC: 26 MMOL/L (ref 21–32)
COLOR UR: YELLOW
CREAT SERPL-MCNC: 0.77 MG/DL (ref 0.6–1.3)
CREAT UR-MCNC: 130 MG/DL
EOSINOPHIL # BLD AUTO: 0.23 THOUSAND/ΜL (ref 0–0.61)
EOSINOPHIL NFR BLD AUTO: 3 % (ref 0–6)
ERYTHROCYTE [DISTWIDTH] IN BLOOD BY AUTOMATED COUNT: 13.6 % (ref 11.6–15.1)
EST. AVERAGE GLUCOSE BLD GHB EST-MCNC: 117 MG/DL
GFR SERPL CREATININE-BSD FRML MDRD: 113 ML/MIN/1.73SQ M
GLUCOSE P FAST SERPL-MCNC: 101 MG/DL (ref 65–99)
GLUCOSE UR STRIP-MCNC: NEGATIVE MG/DL
HBA1C MFR BLD: 5.7 %
HCT VFR BLD AUTO: 44.6 % (ref 36.5–49.3)
HDLC SERPL-MCNC: 43 MG/DL
HGB BLD-MCNC: 15.1 G/DL (ref 12–17)
HGB UR QL STRIP.AUTO: NEGATIVE
IMM GRANULOCYTES # BLD AUTO: 0.14 THOUSAND/UL (ref 0–0.2)
IMM GRANULOCYTES NFR BLD AUTO: 2 % (ref 0–2)
KETONES UR STRIP-MCNC: NEGATIVE MG/DL
LDLC SERPL CALC-MCNC: 124 MG/DL (ref 0–100)
LEUKOCYTE ESTERASE UR QL STRIP: NEGATIVE
LYMPHOCYTES # BLD AUTO: 2.89 THOUSANDS/ΜL (ref 0.6–4.47)
LYMPHOCYTES NFR BLD AUTO: 41 % (ref 14–44)
MCH RBC QN AUTO: 30.7 PG (ref 26.8–34.3)
MCHC RBC AUTO-ENTMCNC: 33.9 G/DL (ref 31.4–37.4)
MCV RBC AUTO: 91 FL (ref 82–98)
MICROALBUMIN UR-MCNC: 5 MG/L (ref 0–20)
MICROALBUMIN/CREAT 24H UR: 4 MG/G CREATININE (ref 0–30)
MONOCYTES # BLD AUTO: 0.7 THOUSAND/ΜL (ref 0.17–1.22)
MONOCYTES NFR BLD AUTO: 10 % (ref 4–12)
NEUTROPHILS # BLD AUTO: 3.12 THOUSANDS/ΜL (ref 1.85–7.62)
NEUTS SEG NFR BLD AUTO: 43 % (ref 43–75)
NITRITE UR QL STRIP: NEGATIVE
NRBC BLD AUTO-RTO: 0 /100 WBCS
PH UR STRIP.AUTO: 8 [PH]
PLATELET # BLD AUTO: 358 THOUSANDS/UL (ref 149–390)
PMV BLD AUTO: 10.3 FL (ref 8.9–12.7)
POTASSIUM SERPL-SCNC: 3.8 MMOL/L (ref 3.5–5.3)
PROT SERPL-MCNC: 7.3 G/DL (ref 6.4–8.2)
PROT UR STRIP-MCNC: NEGATIVE MG/DL
RBC # BLD AUTO: 4.92 MILLION/UL (ref 3.88–5.62)
SODIUM SERPL-SCNC: 139 MMOL/L (ref 136–145)
SP GR UR STRIP.AUTO: 1.02 (ref 1–1.03)
TRIGL SERPL-MCNC: 152 MG/DL
TSH SERPL DL<=0.05 MIU/L-ACNC: 1.41 UIU/ML (ref 0.36–3.74)
UROBILINOGEN UR QL STRIP.AUTO: 0.2 E.U./DL
WBC # BLD AUTO: 7.14 THOUSAND/UL (ref 4.31–10.16)

## 2021-08-07 PROCEDURE — 82043 UR ALBUMIN QUANTITATIVE: CPT | Performed by: FAMILY MEDICINE

## 2021-08-07 PROCEDURE — 80061 LIPID PANEL: CPT

## 2021-08-07 PROCEDURE — 84443 ASSAY THYROID STIM HORMONE: CPT

## 2021-08-07 PROCEDURE — 80053 COMPREHEN METABOLIC PANEL: CPT

## 2021-08-07 PROCEDURE — 82570 ASSAY OF URINE CREATININE: CPT | Performed by: FAMILY MEDICINE

## 2021-08-07 PROCEDURE — 83036 HEMOGLOBIN GLYCOSYLATED A1C: CPT

## 2021-08-07 PROCEDURE — 81003 URINALYSIS AUTO W/O SCOPE: CPT | Performed by: FAMILY MEDICINE

## 2021-08-07 PROCEDURE — 36415 COLL VENOUS BLD VENIPUNCTURE: CPT

## 2021-08-07 PROCEDURE — 85025 COMPLETE CBC W/AUTO DIFF WBC: CPT

## 2022-01-02 ENCOUNTER — OFFICE VISIT (OUTPATIENT)
Dept: URGENT CARE | Age: 42
End: 2022-01-02
Payer: COMMERCIAL

## 2022-01-02 VITALS
HEIGHT: 67 IN | WEIGHT: 220 LBS | OXYGEN SATURATION: 97 % | TEMPERATURE: 97 F | RESPIRATION RATE: 20 BRPM | BODY MASS INDEX: 34.53 KG/M2 | HEART RATE: 78 BPM

## 2022-01-02 DIAGNOSIS — J06.9 VIRAL URI WITH COUGH: Primary | ICD-10-CM

## 2022-01-02 LAB — S PYO AG THROAT QL: NEGATIVE

## 2022-01-02 PROCEDURE — 87880 STREP A ASSAY W/OPTIC: CPT | Performed by: FAMILY MEDICINE

## 2022-01-02 PROCEDURE — 87636 SARSCOV2 & INF A&B AMP PRB: CPT | Performed by: FAMILY MEDICINE

## 2022-01-02 PROCEDURE — 87070 CULTURE OTHR SPECIMN AEROBIC: CPT | Performed by: FAMILY MEDICINE

## 2022-01-02 PROCEDURE — G0382 LEV 3 HOSP TYPE B ED VISIT: HCPCS | Performed by: FAMILY MEDICINE

## 2022-01-02 NOTE — PROGRESS NOTES
[address] [facility]        NAME: Reggie Wall is a 39 y o  male  : 1980    MRN: [id number]  DATE: 2022  TIME: 1:26 PM    Assessment and Plan   Viral URI with cough [J06 9]  1  Viral URI with cough  Throat culture    POCT rapid strepA    COVID/FLU- Office Collect         Patient Instructions     COVID & Flu swab collected today, test results pending  COVID & Flu test results are available between 24 and 48 hours  Your results will come through [address]  [facility] and call if needed for test results  Quarantine guidelines discussed  OTC supplements/medications discussed  Follow-up with PCP in the next 1-2 days for re-evaluation if symptoms continue or worsen  Go to the ED if any fevers, unable to stay hydrated, abdominal pain, chest pain, shortness of breath, wheezing, chest tightness, cough or cold symptoms, new or worsening symptoms or other concerning symptoms  Chief Complaint     Chief Complaint   Patient presents with    Cough     cough, sore throat, fever x 4 days         History of Present Illness     Reggie Wall is a 39 y o  male presenting to the office today for upper respiratory complaints  Symptoms have been present for 4 days, and include cough, congestion, fevers  He has tried nothing  Sick contacts include: none  Recent travel: none    Review of Systems     Review of Systems   Constitutional: Positive for chills, fatigue and fever  HENT: Positive for congestion, postnasal drip and sore throat  Negative for ear discharge, ear pain, sinus pressure and sinus pain  Eyes: Negative for pain and discharge  Respiratory: Positive for cough  Negative for shortness of breath  Cardiovascular: Negative for chest pain and palpitations  Gastrointestinal: Negative for abdominal pain, diarrhea, nausea and vomiting  Genitourinary: Negative for difficulty urinating and dysuria  Musculoskeletal: Positive for myalgias  Negative for arthralgias     Skin: Negative for rash  Neurological: Negative for dizziness, syncope, light-headedness, numbness and headaches  Psychiatric/Behavioral: Negative for agitation  All other systems reviewed and are negative        Current Medications       Current Outpatient Medications:     albuterol (Ventolin HFA) 90 mcg/act inhaler, Inhale 2 puffs every 4 (four) hours as needed for wheezing, Disp: 18 g, Rfl: 0    acetaminophen-codeine (TYLENOL #3) 300-30 mg per tablet, Take 1 tablet by mouth every 4 (four) hours as needed for moderate pain (Patient not taking: Reported on 1/2/2022 ), Disp: 15 tablet, Rfl: 0    albuterol (PROVENTIL HFA,VENTOLIN HFA) 90 mcg/act inhaler, Inhale 2 puffs every 6 (six) hours as needed for wheezing or shortness of breath (Patient not taking: Reported on 6/26/2021), Disp: 1 Inhaler, Rfl: 1    EPINEPHrine (EPIPEN) 0 3 mg/0 3 mL SOAJ, Inject 0 3 mL (0 3 mg total) into a muscle once for 1 dose, Disp: 0 6 mL, Rfl: 0    famotidine (PEPCID) 20 mg tablet, Take 1 tablet (20 mg total) by mouth 2 (two) times a day (Patient not taking: Reported on 1/2/2022 ), Disp: 30 tablet, Rfl: 0    fluticasone (FLONASE) 50 mcg/act nasal spray, 2 sprays into each nostril daily (Patient not taking: Reported on 6/26/2021), Disp: 16 g, Rfl: 0    hydrOXYzine HCL (ATARAX) 25 mg tablet, Take 1 tablet (25 mg total) by mouth daily at bedtime as needed (insomnia) (Patient not taking: Reported on 6/26/2021), Disp: 30 tablet, Rfl: 0    methylPREDNISolone 4 MG tablet therapy pack, Use as directed on package (Patient not taking: Reported on 1/2/2022 ), Disp: 21 each, Rfl: 0    Mirabegron ER (Myrbetriq) 25 MG TB24, Take 25 mg by mouth daily at bedtime as needed (prostate symptoms) (Patient not taking: Reported on 6/26/2021), Disp: 30 tablet, Rfl: 0    Mirabegron ER 50 MG TB24, Take 1 tablet (50 mg total) by mouth daily at bedtime as needed (OAB) (Patient not taking: Reported on 6/26/2021), Disp: 90 tablet, Rfl: 1    Multiple Vitamin (MULTIVITAMIN) capsule, Take 1 capsule by mouth daily (Patient not taking: Reported on 6/26/2021), Disp: , Rfl:     simethicone (MYLICON,GAS-X) 419 MG capsule, Take 1 capsule (180 mg total) by mouth every 6 (six) hours as needed (abdominal cramping) (Patient not taking: Reported on 6/26/2021), Disp: 30 capsule, Rfl: 1    zolpidem (AMBIEN) 5 mg tablet, Bring to sleep study  If unable to sleep in 30 minutes take 1 pill  If unable to sleep 30 minutes after 1st dose take 2nd pill  (Patient not taking: Reported on 6/26/2021), Disp: 2 tablet, Rfl: 0    Current Allergies     Allergies as of 01/02/2022    (No Known Allergies)            The following portions of the patient's history were reviewed and updated as appropriate: allergies, current medications, past family history, past medical history, past social history, past surgical history and problem list      Past Medical History:   Diagnosis Date    Hypertension     Last assessed 2/21/2017        Past Surgical History:   Procedure Laterality Date    HERNIA REPAIR      KNEE SURGERY      Last assessed 2/21/2017        Family History   Problem Relation Age of Onset    Heart attack Father     Hypertension Father     Breast cancer Family     Breast cancer Family     Colon cancer Neg Hx        Medications have been verified  Objective     Pulse 78   Temp (!) 97 °F (36 1 °C)   Resp 20   Ht 5' 7" (1 702 m)   Wt 99 8 kg (220 lb)   SpO2 97%   BMI 34 46 kg/m²   No LMP for male patient  Physical Exam     Physical Exam  Vitals reviewed  Constitutional:       General: He is not in acute distress  Appearance: Normal appearance  He is not ill-appearing  HENT:      Head: Normocephalic and atraumatic  Right Ear: No middle ear effusion  Left Ear:  No middle ear effusion  Mouth/Throat:      Mouth: Mucous membranes are moist       Pharynx: Posterior oropharyngeal erythema present  No oropharyngeal exudate  Tonsils: No tonsillar exudate  Eyes:      Extraocular Movements: Extraocular movements intact  Conjunctiva/sclera: Conjunctivae normal       Pupils: Pupils are equal, round, and reactive to light  Cardiovascular:      Rate and Rhythm: Normal rate and regular rhythm  Pulses: Normal pulses  Heart sounds: Normal heart sounds  No murmur heard  Pulmonary:      Effort: Pulmonary effort is normal  No respiratory distress  Breath sounds: Normal breath sounds  No wheezing  Abdominal:      General: Bowel sounds are normal  There is no distension  Palpations: Abdomen is soft  Tenderness: There is no abdominal tenderness  There is no guarding  Musculoskeletal:      Cervical back: Normal range of motion and neck supple  No tenderness  Lymphadenopathy:      Cervical: Cervical adenopathy present  Skin:     General: Skin is warm  Neurological:      General: No focal deficit present  Mental Status: He is alert     Psychiatric:         Mood and Affect: Mood normal          Behavior: Behavior normal          Judgment: Judgment normal

## 2022-01-02 NOTE — LETTER
To whom it may concern,      Vicente Llamas was seen in my office on 01/02/22  He may return to work following a negative test result as long as he remains fever free for 24 hours without the aid of any fever-reducing medication  Thank you!       Sincerely,    Ross Patrick, DO

## 2022-01-05 LAB — BACTERIA THROAT CULT: NORMAL

## 2022-01-07 LAB
FLUAV RNA RESP QL NAA+PROBE: NEGATIVE
FLUBV RNA RESP QL NAA+PROBE: NEGATIVE
SARS-COV-2 RNA RESP QL NAA+PROBE: POSITIVE

## 2022-06-28 ENCOUNTER — OFFICE VISIT (OUTPATIENT)
Dept: FAMILY MEDICINE CLINIC | Facility: CLINIC | Age: 42
End: 2022-06-28
Payer: COMMERCIAL

## 2022-06-28 VITALS
HEART RATE: 108 BPM | SYSTOLIC BLOOD PRESSURE: 150 MMHG | TEMPERATURE: 101.5 F | WEIGHT: 220 LBS | RESPIRATION RATE: 16 BRPM | DIASTOLIC BLOOD PRESSURE: 90 MMHG | BODY MASS INDEX: 34.53 KG/M2 | OXYGEN SATURATION: 99 % | HEIGHT: 67 IN

## 2022-06-28 DIAGNOSIS — R11.2 NAUSEA AND VOMITING, UNSPECIFIED VOMITING TYPE: Primary | ICD-10-CM

## 2022-06-28 DIAGNOSIS — T63.441A BEE STING REACTION, ACCIDENTAL OR UNINTENTIONAL, INITIAL ENCOUNTER: ICD-10-CM

## 2022-06-28 DIAGNOSIS — N32.81 OAB (OVERACTIVE BLADDER): ICD-10-CM

## 2022-06-28 PROCEDURE — 1036F TOBACCO NON-USER: CPT | Performed by: FAMILY MEDICINE

## 2022-06-28 PROCEDURE — 99213 OFFICE O/P EST LOW 20 MIN: CPT | Performed by: FAMILY MEDICINE

## 2022-06-28 PROCEDURE — 87636 SARSCOV2 & INF A&B AMP PRB: CPT | Performed by: FAMILY MEDICINE

## 2022-06-28 PROCEDURE — 3008F BODY MASS INDEX DOCD: CPT | Performed by: FAMILY MEDICINE

## 2022-06-28 PROCEDURE — 3725F SCREEN DEPRESSION PERFORMED: CPT | Performed by: FAMILY MEDICINE

## 2022-06-28 RX ORDER — EPINEPHRINE 0.3 MG/.3ML
0.3 INJECTION SUBCUTANEOUS ONCE
Qty: 0.6 ML | Refills: 0 | Status: SHIPPED | OUTPATIENT
Start: 2022-06-28 | End: 2022-06-28

## 2022-06-28 RX ORDER — ONDANSETRON 4 MG/1
4 TABLET, ORALLY DISINTEGRATING ORAL EVERY 8 HOURS PRN
Qty: 30 TABLET | Refills: 1 | Status: SHIPPED | OUTPATIENT
Start: 2022-06-28

## 2022-06-28 NOTE — LETTER
June 28, 2022     Patient: Yenny Scott  YOB: 1980  Date of Visit: 6/28/2022      To Whom it May Concern:    Yenny Scott is under my professional care  Lois Perez was seen in my office on 6/28/2022  Lois Perez may return to work on 7/1/2022  If you have any questions or concerns, please don't hesitate to call           Sincerely,          Jeramie Elias MD        CC: No Recipients

## 2022-06-29 LAB
FLUAV RNA RESP QL NAA+PROBE: NEGATIVE
FLUBV RNA RESP QL NAA+PROBE: NEGATIVE
SARS-COV-2 RNA RESP QL NAA+PROBE: NEGATIVE

## 2022-06-30 DIAGNOSIS — R11.2 NAUSEA AND VOMITING, UNSPECIFIED VOMITING TYPE: Primary | ICD-10-CM

## 2022-06-30 DIAGNOSIS — B34.9 VIRAL SYNDROME: ICD-10-CM

## 2022-06-30 RX ORDER — AMOXICILLIN 500 MG/1
500 CAPSULE ORAL 3 TIMES DAILY
Qty: 21 CAPSULE | Refills: 0 | Status: SHIPPED | OUTPATIENT
Start: 2022-06-30 | End: 2022-07-07

## 2022-06-30 RX ORDER — AMOXICILLIN 500 MG/1
500 TABLET, FILM COATED ORAL 3 TIMES DAILY
Qty: 21 TABLET | Refills: 0 | Status: SHIPPED | OUTPATIENT
Start: 2022-06-30 | End: 2022-07-07

## 2022-06-30 NOTE — LETTER
June 30, 2022     Tonny Ellis    Patient: Tonny Ellis   YOB: 1980   Date of Visit: 06/28/2022     To whom it may concern,    Tonny Ellis is currently under my medical care  Roland Maria was seen in my office on 6-  He may return to work on 7-6-2022  If you have any questions concerning this please contact our office at [phone]         Smiley House MD

## 2022-06-30 NOTE — PROGRESS NOTES
COVID-19 Outpatient Progress Note    Assessment/Plan:    Problem List Items Addressed This Visit    None     Visit Diagnoses     Nausea and vomiting, unspecified vomiting type    -  Primary    Relevant Medications    ondansetron (Zofran ODT) 4 mg disintegrating tablet    Other Relevant Orders    Covid19 and INFLUENZA A/B PCR (Completed)    Bee sting reaction, accidental or unintentional, initial encounter        OAB (overactive bladder)        Relevant Medications    Mirabegron ER 50 MG TB24         Disposition:     PCR testing will be performed to test for COVID-19/Influenza  I have spent 10 minutes directly with the patient  Greater than 50% of this time was spent in counseling/coordination of care regarding: risks and benefits of treatment options, instructions for management and patient and family education  Encounter provider Edson Vazquez MD    Provider located at [address] [place] [address] , [address] [address] [place] [address] Road  [phone]    Recent Visits  Date Type Provider Dept   06/28/22 Office Visit Edson Vazquez MD [facility] 93 recent visits within past 7 days and meeting all other requirements  Future Appointments  No visits were found meeting these conditions  Showing future appointments within next 150 days and meeting all other requirements     Subjective:   Kimberlyn Morales is a 43 y o  male who is concerned about COVID-19  Patient's symptoms include fever, chills, nasal congestion, cough, nausea and vomiting             COVID-19 vaccination status: Fully vaccinated (primary series)    Exposure:     Hospitalized recently for fever and/or lower respiratory symptoms?: No      Currently a healthcare worker that is involved in direct patient care?: No      Works in a special setting where the risk of COVID-19 transmission may be high? (this may include long-term care, correctional and intermediate facilities; homeless shelters; assisted-living facilities and group homes ): No      Resident in a special setting where the risk of COVID-19 transmission may be high? (this may include long-term care, correctional and shelter facilities; homeless shelters; assisted-living facilities and group homes ): No      Lab Results   Component Value Date    SARSCOV2 Negative 06/28/2022    SARSCOV2 NOT DETECTED 01/26/2021    [address] [facility] Not Detected 10/26/2020     Past Medical History:   Diagnosis Date    Hypertension     Last assessed 2/21/2017      Past Surgical History:   Procedure Laterality Date    HERNIA REPAIR      KNEE SURGERY      Last assessed 2/21/2017      Current Outpatient Medications   Medication Sig Dispense Refill    albuterol (Ventolin HFA) 90 mcg/act inhaler Inhale 2 puffs every 4 (four) hours as needed for wheezing 18 g 0    EPINEPHrine (EPIPEN) 0 3 mg/0 3 mL SOAJ Inject 0 3 mL (0 3 mg total) into a muscle once for 1 dose 0 6 mL 0    Mirabegron ER 50 MG TB24 Take 1 tablet (50 mg total) by mouth daily at bedtime as needed (OAB) 90 tablet 1    ondansetron (Zofran ODT) 4 mg disintegrating tablet Take 1 tablet (4 mg total) by mouth every 8 (eight) hours as needed for nausea or vomiting 30 tablet 1    acetaminophen-codeine (TYLENOL #3) 300-30 mg per tablet Take 1 tablet by mouth every 4 (four) hours as needed for moderate pain (Patient not taking: No sig reported) 15 tablet 0    albuterol (PROVENTIL HFA,VENTOLIN HFA) 90 mcg/act inhaler Inhale 2 puffs every 6 (six) hours as needed for wheezing or shortness of breath (Patient not taking: No sig reported) 1 Inhaler 1    famotidine (PEPCID) 20 mg tablet Take 1 tablet (20 mg total) by mouth 2 (two) times a day (Patient not taking: No sig reported) 30 tablet 0    fluticasone (FLONASE) 50 mcg/act nasal spray 2 sprays into each nostril daily (Patient not taking: No sig reported) 16 g 0    hydrOXYzine HCL (ATARAX) 25 mg tablet Take 1 tablet (25 mg total) by mouth daily at bedtime as needed (insomnia) (Patient not taking: No sig reported) 30 tablet 0    methylPREDNISolone 4 MG tablet therapy pack Use as directed on package (Patient not taking: No sig reported) 21 each 0    Mirabegron ER (Myrbetriq) 25 MG TB24 Take 25 mg by mouth daily at bedtime as needed (prostate symptoms) (Patient not taking: No sig reported) 30 tablet 0    Multiple Vitamin (MULTIVITAMIN) capsule Take 1 capsule by mouth daily (Patient not taking: No sig reported)      simethicone (MYLICON,GAS-X) 950 MG capsule Take 1 capsule (180 mg total) by mouth every 6 (six) hours as needed (abdominal cramping) (Patient not taking: No sig reported) 30 capsule 1    zolpidem (AMBIEN) 5 mg tablet Bring to sleep study  If unable to sleep in 30 minutes take 1 pill  If unable to sleep 30 minutes after 1st dose take 2nd pill  (Patient not taking: No sig reported) 2 tablet 0     No current facility-administered medications for this visit  No Known Allergies    Review of Systems   Constitutional: Positive for chills and fever  HENT: Positive for congestion  Eyes: Negative  Respiratory: Positive for cough  Cardiovascular: Negative  Gastrointestinal: Positive for nausea and vomiting  Endocrine: Negative  Genitourinary: Negative  Musculoskeletal: Negative  Allergic/Immunologic: Negative  Neurological: Negative  Hematological: Negative  Psychiatric/Behavioral: Negative  All other systems reviewed and are negative  Objective:    Vitals:    06/28/22 1508   BP: 150/90   BP Location: Left arm   Patient Position: Sitting   Cuff Size: Standard   Pulse: (!) 108   Resp: 16   Temp: (!) 101 5 °F (38 6 °C)   TempSrc: Tympanic   SpO2: 99%   Weight: 99 8 kg (220 lb)   Height: 5' 7" (1 702 m)       Physical Exam  Vitals reviewed  Constitutional:       General: He is not in acute distress  Appearance: He is well-developed  He is not diaphoretic     HENT:      Head: Normocephalic and atraumatic  Right Ear: External ear normal       Left Ear: External ear normal       Nose: Nose normal    Eyes:      General: No scleral icterus  Right eye: No discharge  Left eye: No discharge  Conjunctiva/sclera: Conjunctivae normal       Pupils: Pupils are equal, round, and reactive to light  Neck:      Thyroid: No thyromegaly  Trachea: No tracheal deviation  Cardiovascular:      Rate and Rhythm: Normal rate and regular rhythm  Heart sounds: Normal heart sounds  No murmur heard  No friction rub  Pulmonary:      Effort: Pulmonary effort is normal  No respiratory distress  Breath sounds: Normal breath sounds  No stridor  No wheezing or rales  Abdominal:      General: There is no distension  Palpations: Abdomen is soft  There is no mass  Tenderness: There is no abdominal tenderness  There is no guarding or rebound  Musculoskeletal:         General: Normal range of motion  Cervical back: Normal range of motion and neck supple  Lymphadenopathy:      Cervical: No cervical adenopathy  Skin:     General: Skin is warm  Neurological:      Mental Status: He is alert and oriented to person, place, and time  Cranial Nerves: No cranial nerve deficit  Psychiatric:         Behavior: Behavior normal          Thought Content: Thought content normal          Judgment: Judgment normal          VIRTUAL VISIT DISCLAIMER    Odell Herrera verbally agrees to participate in [employer]  Pt is aware that [employer] could be limited without vital signs or the ability to perform a full hands-on physical Mandy Santamaria understands he or the provider may request at any time to terminate the video visit and request the patient to seek care or treatment in person

## 2022-06-30 NOTE — TELEPHONE ENCOUNTER
Patients wife called in stating patients covid/flu came back negative but he still has a fever and body aches and now his mucous is brownish red and she is wondering if he should be on an antibiotic  Please advise  Patient will also like to know if he could extend his note to be out of work until Wednesday 7/6/2022 because he can not return tomorrow at this point

## 2022-08-05 DIAGNOSIS — B34.9 VIRAL SYNDROME: ICD-10-CM

## 2022-08-05 RX ORDER — ALBUTEROL SULFATE 90 UG/1
2 AEROSOL, METERED RESPIRATORY (INHALATION) EVERY 4 HOURS PRN
Qty: 18 G | Refills: 0 | Status: SHIPPED | OUTPATIENT
Start: 2022-08-05

## 2022-08-16 ENCOUNTER — TELEPHONE (OUTPATIENT)
Dept: FAMILY MEDICINE CLINIC | Facility: CLINIC | Age: 42
End: 2022-08-16

## 2022-08-16 DIAGNOSIS — N32.81 OAB (OVERACTIVE BLADDER): Primary | ICD-10-CM

## 2022-08-16 RX ORDER — OXYBUTYNIN CHLORIDE 5 MG/1
5 TABLET, EXTENDED RELEASE ORAL DAILY
Qty: 30 TABLET | Refills: 1 | Status: SHIPPED | OUTPATIENT
Start: 2022-08-16

## 2022-08-16 NOTE — TELEPHONE ENCOUNTER
Patients wife came into office stating he received a letter stating the Teretha Taylors was denied and she is wondering if there is something else that can be prescribed  Please advise

## 2022-11-07 ENCOUNTER — OFFICE VISIT (OUTPATIENT)
Dept: FAMILY MEDICINE CLINIC | Facility: CLINIC | Age: 42
End: 2022-11-07

## 2022-11-07 VITALS
OXYGEN SATURATION: 98 % | WEIGHT: 217 LBS | SYSTOLIC BLOOD PRESSURE: 126 MMHG | DIASTOLIC BLOOD PRESSURE: 86 MMHG | RESPIRATION RATE: 16 BRPM | HEART RATE: 80 BPM | BODY MASS INDEX: 34.06 KG/M2 | TEMPERATURE: 97.8 F | HEIGHT: 67 IN

## 2022-11-07 DIAGNOSIS — Z23 IMMUNIZATION DUE: Primary | ICD-10-CM

## 2022-11-07 DIAGNOSIS — L23.7 POISON IVY: ICD-10-CM

## 2022-11-07 RX ORDER — PREDNISONE 10 MG/1
TABLET ORAL
Qty: 30 TABLET | Refills: 0 | Status: SHIPPED | OUTPATIENT
Start: 2022-11-07

## 2022-11-07 NOTE — PROGRESS NOTES
Name: Clement Arroyo      : 1980      MRN: [id number]  Encounter Provider: Christa Rizo PA-C  Encounter Date: 2022   Encounter department: [facility] [address] [place] [address]     1  Poison ivy  -     predniSONE 10 mg tablet; Take 5 tablets for 2 days then decrease by 1 tablet every 2 days until you complete the course with 1 tablet for 2 days    -prednisone taper with food, no alcohol as directed  Follow-up if there is no improvement over the next several days or if symptoms worsen  -he did agree to getting his flu vaccine today  -I did recommend he make an appointment with Dr Billy Bardales for his annual physical    [employer] software was used to dictate this note  It may contain errors with dictating incorrect words/spelling  Please contact provider directly for any questions  Subjective      Patient sees Dr Billy Bardales  He is here today for acute visit for evaluation of poison rash on his right upper extremity  He states he also notices itchiness of his scalp  He states he was recently weeding over the last several days  He states he is very prone to getting poison rashes  He states he is usually put on prednisone  He has not tried anything over-the-counter  He states the rash is very itchy  Denies any open areas at this time  No redness or drainage  Review of Systems   Constitutional: Negative for chills and fever     Skin:        As stated in HPI       Current Outpatient Medications on File Prior to Visit   Medication Sig   • acetaminophen-codeine (TYLENOL #3) 300-30 mg per tablet Take 1 tablet by mouth every 4 (four) hours as needed for moderate pain (Patient not taking: No sig reported)   • albuterol (PROVENTIL HFA,VENTOLIN HFA) 90 mcg/act inhaler Inhale 2 puffs every 6 (six) hours as needed for wheezing or shortness of breath (Patient not taking: No sig reported)   • albuterol (Ventolin HFA) 90 mcg/act inhaler Inhale 2 puffs every 4 (four) hours as needed for wheezing (Patient not taking: Reported on 11/7/2022)   • EPINEPHrine (EPIPEN) 0 3 mg/0 3 mL SOAJ Inject 0 3 mL (0 3 mg total) into a muscle once for 1 dose   • famotidine (PEPCID) 20 mg tablet Take 1 tablet (20 mg total) by mouth 2 (two) times a day (Patient not taking: No sig reported)   • fluticasone (FLONASE) 50 mcg/act nasal spray 2 sprays into each nostril daily (Patient not taking: No sig reported)   • hydrOXYzine HCL (ATARAX) 25 mg tablet Take 1 tablet (25 mg total) by mouth daily at bedtime as needed (insomnia) (Patient not taking: No sig reported)   • Mirabegron ER (Myrbetriq) 25 MG TB24 Take 25 mg by mouth daily at bedtime as needed (prostate symptoms) (Patient not taking: No sig reported)   • Mirabegron ER 50 MG TB24 Take 1 tablet (50 mg total) by mouth daily at bedtime as needed (OAB) (Patient not taking: Reported on 11/7/2022)   • Multiple Vitamin (MULTIVITAMIN) capsule Take 1 capsule by mouth daily (Patient not taking: No sig reported)   • ondansetron (Zofran ODT) 4 mg disintegrating tablet Take 1 tablet (4 mg total) by mouth every 8 (eight) hours as needed for nausea or vomiting (Patient not taking: Reported on 11/7/2022)   • oxybutynin (DITROPAN-XL) 5 mg 24 hr tablet Take 1 tablet (5 mg total) by mouth daily (Patient not taking: Reported on 11/7/2022)   • simethicone (MYLICON,GAS-X) 029 MG capsule Take 1 capsule (180 mg total) by mouth every 6 (six) hours as needed (abdominal cramping) (Patient not taking: No sig reported)   • zolpidem (AMBIEN) 5 mg tablet Bring to sleep study  If unable to sleep in 30 minutes take 1 pill  If unable to sleep 30 minutes after 1st dose take 2nd pill   (Patient not taking: No sig reported)   • [DISCONTINUED] methylPREDNISolone 4 MG tablet therapy pack Use as directed on package (Patient not taking: No sig reported)       Objective     /86 (BP Location: Left arm, Patient Position: Sitting, Cuff Size: Large)   Pulse 80   Temp 97 8 °F (36 6 °C) (Tympanic)   Resp 16   Ht 5' 7" (1 702 m)   Wt 98 4 kg (217 lb)   SpO2 98%   BMI 33 99 kg/m²     Physical Exam  Vitals reviewed  Constitutional:       General: He is not in acute distress  Appearance: Normal appearance  He is not ill-appearing, toxic-appearing or diaphoretic  Musculoskeletal:      Cervical back: Neck supple  Skin:     Comments: Right upper extremity:  He does have several patches which are about 3-4 cm of  vesicular lesions with no surrounding erythema  No drainage  Neurological:      General: No focal deficit present  Mental Status: He is alert  Psychiatric:         Mood and Affect: Mood normal          Behavior: Behavior normal          Thought Content:  Thought content normal          Judgment: Judgment normal        Salina Lopez PA-C

## 2022-11-29 ENCOUNTER — OFFICE VISIT (OUTPATIENT)
Dept: FAMILY MEDICINE CLINIC | Facility: CLINIC | Age: 42
End: 2022-11-29

## 2022-11-29 VITALS
WEIGHT: 220 LBS | HEIGHT: 67 IN | BODY MASS INDEX: 34.53 KG/M2 | OXYGEN SATURATION: 97 % | SYSTOLIC BLOOD PRESSURE: 118 MMHG | DIASTOLIC BLOOD PRESSURE: 84 MMHG | TEMPERATURE: 98.1 F | HEART RATE: 64 BPM | RESPIRATION RATE: 18 BRPM

## 2022-11-29 DIAGNOSIS — I10 BENIGN ESSENTIAL HYPERTENSION: ICD-10-CM

## 2022-11-29 DIAGNOSIS — Z00.00 ROUTINE ADULT HEALTH MAINTENANCE: ICD-10-CM

## 2022-11-29 DIAGNOSIS — L30.9 DERMATITIS: Primary | ICD-10-CM

## 2022-11-29 DIAGNOSIS — E78.5 BORDERLINE HYPERLIPIDEMIA: ICD-10-CM

## 2022-11-29 DIAGNOSIS — R73.01 IFG (IMPAIRED FASTING GLUCOSE): ICD-10-CM

## 2022-11-29 RX ORDER — TRIAMCINOLONE ACETONIDE 1 MG/G
CREAM TOPICAL 2 TIMES DAILY
Qty: 30 G | Refills: 0 | Status: SHIPPED | OUTPATIENT
Start: 2022-11-29

## 2022-12-05 ENCOUNTER — APPOINTMENT (OUTPATIENT)
Dept: LAB | Facility: HOSPITAL | Age: 42
End: 2022-12-05

## 2022-12-05 DIAGNOSIS — I10 BENIGN ESSENTIAL HYPERTENSION: ICD-10-CM

## 2022-12-05 DIAGNOSIS — R73.01 IFG (IMPAIRED FASTING GLUCOSE): ICD-10-CM

## 2022-12-05 DIAGNOSIS — Z00.00 ROUTINE ADULT HEALTH MAINTENANCE: ICD-10-CM

## 2022-12-05 DIAGNOSIS — E78.5 BORDERLINE HYPERLIPIDEMIA: ICD-10-CM

## 2022-12-05 DIAGNOSIS — L30.9 DERMATITIS: ICD-10-CM

## 2022-12-05 LAB
ALBUMIN SERPL BCP-MCNC: 3.7 G/DL (ref 3.5–5)
ALP SERPL-CCNC: 60 U/L (ref 46–116)
ALT SERPL W P-5'-P-CCNC: 38 U/L (ref 12–78)
ANION GAP SERPL CALCULATED.3IONS-SCNC: 7 MMOL/L (ref 4–13)
AST SERPL W P-5'-P-CCNC: 23 U/L (ref 5–45)
BASOPHILS # BLD AUTO: 0.03 THOUSANDS/ÂΜL (ref 0–0.1)
BASOPHILS NFR BLD AUTO: 1 % (ref 0–1)
BILIRUB SERPL-MCNC: 0.39 MG/DL (ref 0.2–1)
BILIRUB UR QL STRIP: NEGATIVE
BUN SERPL-MCNC: 13 MG/DL (ref 5–25)
CALCIUM SERPL-MCNC: 8.6 MG/DL (ref 8.3–10.1)
CHLORIDE SERPL-SCNC: 104 MMOL/L (ref 96–108)
CHOLEST SERPL-MCNC: 208 MG/DL
CLARITY UR: CLEAR
CO2 SERPL-SCNC: 30 MMOL/L (ref 21–32)
COLOR UR: YELLOW
CREAT SERPL-MCNC: 0.77 MG/DL (ref 0.6–1.3)
CREAT UR-MCNC: 38.5 MG/DL
EOSINOPHIL # BLD AUTO: 0.2 THOUSAND/ÂΜL (ref 0–0.61)
EOSINOPHIL NFR BLD AUTO: 4 % (ref 0–6)
ERYTHROCYTE [DISTWIDTH] IN BLOOD BY AUTOMATED COUNT: 13.5 % (ref 11.6–15.1)
EST. AVERAGE GLUCOSE BLD GHB EST-MCNC: 111 MG/DL
GFR SERPL CREATININE-BSD FRML MDRD: 111 ML/MIN/1.73SQ M
GLUCOSE P FAST SERPL-MCNC: 98 MG/DL (ref 65–99)
GLUCOSE UR STRIP-MCNC: NEGATIVE MG/DL
HBA1C MFR BLD: 5.5 %
HCT VFR BLD AUTO: 45 % (ref 36.5–49.3)
HDLC SERPL-MCNC: 51 MG/DL
HGB BLD-MCNC: 15.1 G/DL (ref 12–17)
HGB UR QL STRIP.AUTO: NEGATIVE
IMM GRANULOCYTES # BLD AUTO: 0.02 THOUSAND/UL (ref 0–0.2)
IMM GRANULOCYTES NFR BLD AUTO: 0 % (ref 0–2)
KETONES UR STRIP-MCNC: NEGATIVE MG/DL
LDLC SERPL CALC-MCNC: 142 MG/DL (ref 0–100)
LEUKOCYTE ESTERASE UR QL STRIP: NEGATIVE
LYMPHOCYTES # BLD AUTO: 2.28 THOUSANDS/ÂΜL (ref 0.6–4.47)
LYMPHOCYTES NFR BLD AUTO: 41 % (ref 14–44)
MCH RBC QN AUTO: 30.7 PG (ref 26.8–34.3)
MCHC RBC AUTO-ENTMCNC: 33.6 G/DL (ref 31.4–37.4)
MCV RBC AUTO: 92 FL (ref 82–98)
MICROALBUMIN UR-MCNC: <5 MG/L (ref 0–20)
MICROALBUMIN/CREAT 24H UR: <13 MG/G CREATININE (ref 0–30)
MONOCYTES # BLD AUTO: 0.51 THOUSAND/ÂΜL (ref 0.17–1.22)
MONOCYTES NFR BLD AUTO: 9 % (ref 4–12)
NEUTROPHILS # BLD AUTO: 2.52 THOUSANDS/ÂΜL (ref 1.85–7.62)
NEUTS SEG NFR BLD AUTO: 45 % (ref 43–75)
NITRITE UR QL STRIP: NEGATIVE
NRBC BLD AUTO-RTO: 0 /100 WBCS
PH UR STRIP.AUTO: 6 [PH]
PLATELET # BLD AUTO: 251 THOUSANDS/UL (ref 149–390)
PMV BLD AUTO: 10.1 FL (ref 8.9–12.7)
POTASSIUM SERPL-SCNC: 4.9 MMOL/L (ref 3.5–5.3)
PROT SERPL-MCNC: 7.3 G/DL (ref 6.4–8.4)
PROT UR STRIP-MCNC: NEGATIVE MG/DL
RBC # BLD AUTO: 4.92 MILLION/UL (ref 3.88–5.62)
SODIUM SERPL-SCNC: 141 MMOL/L (ref 135–147)
SP GR UR STRIP.AUTO: 1.02 (ref 1–1.03)
TRIGL SERPL-MCNC: 77 MG/DL
TSH SERPL DL<=0.05 MIU/L-ACNC: 1.22 UIU/ML (ref 0.45–4.5)
UROBILINOGEN UR QL STRIP.AUTO: 0.2 E.U./DL
WBC # BLD AUTO: 5.56 THOUSAND/UL (ref 4.31–10.16)

## 2022-12-05 NOTE — PROGRESS NOTES
Assessment/Plan:    41-year-old male with: dermatitis, hypertension, hyperlipidemia, impaired fasting glucose along with annual well visit  Will continue current medications discussed supportive care return parameters regarding annual well visit discussed various safety and health maintenance issues including healthy diet like the Mediterranean diet exercise healthy weight as tolerated ample sleep stress reduction strategies discussed supportive care return parameters otherwise    No problem-specific Assessment & Plan notes found for this encounter  Diagnoses and all orders for this visit:    Dermatitis  -     triamcinolone (KENALOG) 0 1 % cream; Apply topically 2 (two) times a day  -     CBC and differential; Future  -     Comprehensive metabolic panel; Future  -     TSH, 3rd generation with Free T4 reflex; Future  -     Lipid Panel with Direct LDL reflex; Future  -     Hemoglobin A1C; Future  -     Microalbumin / creatinine urine ratio  -     UA (URINE) with reflex to Scope    IFG (impaired fasting glucose)  -     CBC and differential; Future  -     Comprehensive metabolic panel; Future  -     TSH, 3rd generation with Free T4 reflex; Future  -     Lipid Panel with Direct LDL reflex; Future  -     Hemoglobin A1C; Future  -     Microalbumin / creatinine urine ratio  -     UA (URINE) with reflex to Scope    Benign essential hypertension  -     CBC and differential; Future  -     Comprehensive metabolic panel; Future  -     TSH, 3rd generation with Free T4 reflex; Future  -     Lipid Panel with Direct LDL reflex; Future  -     Hemoglobin A1C; Future  -     Microalbumin / creatinine urine ratio  -     UA (URINE) with reflex to Scope    Routine adult health maintenance  -     CBC and differential; Future  -     Comprehensive metabolic panel; Future  -     TSH, 3rd generation with Free T4 reflex; Future  -     Lipid Panel with Direct LDL reflex;  Future  -     Hemoglobin A1C; Future  -     Microalbumin / creatinine urine ratio  -     UA (URINE) with reflex to Scope    Borderline hyperlipidemia  -     CBC and differential; Future  -     Comprehensive metabolic panel; Future  -     TSH, 3rd generation with Free T4 reflex; Future  -     Lipid Panel with Direct LDL reflex; Future  -     Hemoglobin A1C; Future  -     Microalbumin / creatinine urine ratio  -     UA (URINE) with reflex to Scope          Subjective:     Chief Complaint   Patient presents with   • Physical Exam        Patient ID: Eleni Trejo is a 43 y o  male  Patient is a 44-year-old male who presents for follow-up on dermatitis, hypertension, hyperlipidemia, impaired fasting glucose  Patient admits being stable medications denies acute complaints at this time no fevers chills nausea vomiting tolerating p o  intake he is also here for an annual well visit he admits being physically active generally eats healthy diet he sleeps well no other health maintenance issues      The following portions of the patient's history were reviewed and updated as appropriate: allergies, current medications, past family history, past medical history, past social history, past surgical history and problem list     Review of Systems   Constitutional: Negative  HENT: Negative  Eyes: Negative  Respiratory: Negative  Cardiovascular: Negative  Gastrointestinal: Negative  Endocrine: Negative  Genitourinary: Negative  Musculoskeletal: Negative  Allergic/Immunologic: Negative  Neurological: Negative  Hematological: Negative  Psychiatric/Behavioral: Negative  All other systems reviewed and are negative  Objective:      /84 (BP Location: Left arm, Patient Position: Sitting, Cuff Size: Large)   Pulse 64   Temp 98 1 °F (36 7 °C) (Tympanic)   Resp 18   Ht 5' 7" (1 702 m)   Wt 99 8 kg (220 lb)   SpO2 97%   BMI 34 46 kg/m²          Physical Exam  Constitutional:       Appearance: He is well-developed and well-nourished     HENT: Head: Atraumatic  Right Ear: External ear normal       Left Ear: External ear normal    Eyes:      Extraocular Movements: EOM normal       Conjunctiva/sclera: Conjunctivae normal       Pupils: Pupils are equal, round, and reactive to light  Cardiovascular:      Rate and Rhythm: Normal rate and regular rhythm  Heart sounds: Normal heart sounds  Pulmonary:      Effort: Pulmonary effort is normal  No respiratory distress  Breath sounds: Normal breath sounds  Abdominal:      General: There is no distension  Palpations: Abdomen is soft  Tenderness: There is no abdominal tenderness  There is no guarding or rebound  Musculoskeletal:         General: Normal range of motion  Cervical back: Normal range of motion  Skin:     General: Skin is warm and dry  Neurological:      Mental Status: He is alert and oriented to person, place, and time  Cranial Nerves: No cranial nerve deficit  Psychiatric:         Mood and Affect: Mood and affect normal          Behavior: Behavior normal          Thought Content: Thought content normal          Judgment: Judgment normal        BMI Counseling: Body mass index is 34 46 kg/m²  The BMI is above normal  Nutrition recommendations include encouraging healthy choices of fruits and vegetables  Exercise recommendations include moderate physical activity 150 minutes/week  Rationale for BMI follow-up plan is due to patient being overweight or obese  Depression Screening and Follow-up Plan: Patient was screened for depression during today's encounter  They screened negative with a PHQ-2 score of 0

## 2023-01-03 DIAGNOSIS — R06.2 WHEEZING: ICD-10-CM

## 2023-01-03 DIAGNOSIS — N32.81 OAB (OVERACTIVE BLADDER): ICD-10-CM

## 2023-01-03 RX ORDER — ALBUTEROL SULFATE 90 UG/1
2 AEROSOL, METERED RESPIRATORY (INHALATION) EVERY 6 HOURS PRN
Qty: 18 G | Refills: 0 | Status: SHIPPED | OUTPATIENT
Start: 2023-01-03

## 2023-01-03 RX ORDER — OXYBUTYNIN CHLORIDE 5 MG/1
5 TABLET, EXTENDED RELEASE ORAL DAILY
Qty: 30 TABLET | Refills: 1 | Status: SHIPPED | OUTPATIENT
Start: 2023-01-03

## 2023-01-03 NOTE — TELEPHONE ENCOUNTER
Patient has been using triamcinolone for 3 weeks with no improvement, was told to call for something else to try   Please advise

## 2023-01-03 NOTE — TELEPHONE ENCOUNTER
Please advise on another med rather than triamcinalone  Patient states it is not working  See previous note   Please advise

## 2023-01-19 ENCOUNTER — TELEPHONE (OUTPATIENT)
Dept: FAMILY MEDICINE CLINIC | Facility: CLINIC | Age: 43
End: 2023-01-19

## 2023-01-19 DIAGNOSIS — L30.9 DERMATITIS: Primary | ICD-10-CM

## 2023-01-19 RX ORDER — CLOBETASOL PROPIONATE 0.5 MG/G
CREAM TOPICAL 2 TIMES DAILY
Qty: 30 G | Refills: 0 | Status: SHIPPED | OUTPATIENT
Start: 2023-01-19

## 2023-01-19 NOTE — TELEPHONE ENCOUNTER
Sent in clobetasol, but if not improving within a few days he should stop it and consider derm consult

## 2023-04-25 ENCOUNTER — TELEPHONE (OUTPATIENT)
Dept: FAMILY MEDICINE CLINIC | Facility: CLINIC | Age: 43
End: 2023-04-25

## 2023-04-25 DIAGNOSIS — K62.5 RECTAL BLEEDING: Primary | ICD-10-CM

## 2023-04-25 NOTE — TELEPHONE ENCOUNTER
Spoke to pt to let him know referral was placed    I did tell him to call us in a week if he hasn't heard from the colorectal specialist

## 2023-04-25 NOTE — TELEPHONE ENCOUNTER
----- Message from Brandon Ellington sent at 4/24/2023  7:42 PM EDT -----  Regarding: Colonoscopy  Contact: [phone]  I did send you a message regarding updating a referral for the colonoscopy due to bleeding on 4/12/23  Please advise       ----- Message -----  From: Carie Dancer  Sent: 4/24/2023   7:37 PM EDT  To: [facility]  Subject: Colonoscopy                                      Hello I was in on 4/12, I had talked to  dr Carson Varela about a colonoscopy from current and past bleeding that comes and goes  But unfortunately their computers weren’t working that day so he could not look up past discussion about this  Carlotta Brian had given us a call later that day and was able to look up the information and said that she would leave a note for dr Carson Varela to leave a referral , we have not heard anything back yet  Any update ?

## 2023-06-23 ENCOUNTER — OFFICE VISIT (OUTPATIENT)
Dept: FAMILY MEDICINE CLINIC | Facility: CLINIC | Age: 43
End: 2023-06-23
Payer: COMMERCIAL

## 2023-06-23 VITALS
DIASTOLIC BLOOD PRESSURE: 88 MMHG | BODY MASS INDEX: 34.75 KG/M2 | TEMPERATURE: 98.2 F | SYSTOLIC BLOOD PRESSURE: 118 MMHG | HEIGHT: 67 IN | WEIGHT: 221.4 LBS | HEART RATE: 89 BPM | OXYGEN SATURATION: 98 %

## 2023-06-23 DIAGNOSIS — R35.1 NOCTURIA: ICD-10-CM

## 2023-06-23 DIAGNOSIS — T63.441A BEE STING REACTION, ACCIDENTAL OR UNINTENTIONAL, INITIAL ENCOUNTER: ICD-10-CM

## 2023-06-23 DIAGNOSIS — B89 PARASITE INFESTATION: ICD-10-CM

## 2023-06-23 DIAGNOSIS — L30.9 DERMATITIS: Primary | ICD-10-CM

## 2023-06-23 PROCEDURE — 99214 OFFICE O/P EST MOD 30 MIN: CPT | Performed by: FAMILY MEDICINE

## 2023-06-23 RX ORDER — PERMETHRIN 50 MG/G
CREAM TOPICAL ONCE
Qty: 60 G | Refills: 0 | Status: SHIPPED | OUTPATIENT
Start: 2023-06-23 | End: 2023-06-23

## 2023-06-23 RX ORDER — EPINEPHRINE 0.3 MG/.3ML
0.3 INJECTION SUBCUTANEOUS ONCE
Qty: 0.6 ML | Refills: 0 | Status: SHIPPED | OUTPATIENT
Start: 2023-06-23 | End: 2023-06-23

## 2023-06-23 RX ORDER — PREDNISONE 10 MG/1
TABLET ORAL
Qty: 45 TABLET | Refills: 0 | Status: SHIPPED | OUTPATIENT
Start: 2023-06-23

## 2023-06-23 RX ORDER — MIRABEGRON 25 MG/1
25 TABLET, FILM COATED, EXTENDED RELEASE ORAL
Qty: 30 TABLET | Refills: 1 | Status: SHIPPED | OUTPATIENT
Start: 2023-06-23

## 2023-06-26 PROBLEM — B89: Status: ACTIVE | Noted: 2023-06-26

## 2023-06-26 NOTE — PROGRESS NOTES
Assessment/Plan:    36 y/o male with: Dermatitis, nocturia, h/o anaphylaxis  Will give steroid taper along with permethrin and give refill of epipen PRN  Advised if not improving to see derm  Discussed supportive care and return parameters  No problem-specific Assessment & Plan notes found for this encounter  Diagnoses and all orders for this visit:    Dermatitis  -     predniSONE 10 mg tablet; Take 4 tabs daily x3d then 3x3d then 2x3d then 1x3d then 0 5x3d then stop  Bee sting reaction, accidental or unintentional, initial encounter  -     EPINEPHrine (EPIPEN) 0 3 mg/0 3 mL SOAJ; Inject 0 3 mL (0 3 mg total) into a muscle once for 1 dose    Nocturia  -     Mirabegron ER (Myrbetriq) 25 MG TB24; Take 25 mg by mouth daily at bedtime as needed (prostate symptoms)    Parasite infestation  -     permethrin (ELIMITE) 5 % cream; Apply topically once for 1 dose          Subjective:     Chief Complaint   Patient presents with   • Rash     Pt c/o rash in different part of his body  Patient ID: Keely Pelletier is a 37 y o  male  Patient is a 36 y/o male who presents for follow-up on rash on his arms over the past several weeks along with nocturia and h/o anaphylaxis no fevers chills nausea or vomiting    Rash        The following portions of the patient's history were reviewed and updated as appropriate: allergies, current medications, past family history, past medical history, past social history, past surgical history and problem list     Review of Systems   Constitutional: Negative  HENT: Negative  Eyes: Negative  Respiratory: Negative  Cardiovascular: Negative  Gastrointestinal: Negative  Endocrine: Negative  Genitourinary: Negative  Musculoskeletal: Negative  Skin: Positive for rash  Allergic/Immunologic: Negative  Neurological: Negative  Hematological: Negative  Psychiatric/Behavioral: Negative  All other systems reviewed and are negative  "    Objective:      /88 (BP Location: Right arm, Patient Position: Sitting, Cuff Size: Standard)   Pulse 89   Temp 98 2 °F (36 8 °C) (Tympanic)   Ht 5' 7\" (1 702 m)   Wt 100 kg (221 lb 6 4 oz)   SpO2 98%   BMI 34 68 kg/m²          Physical Exam  Constitutional:       Appearance: He is well-developed  HENT:      Head: Atraumatic  Right Ear: External ear normal       Left Ear: External ear normal    Eyes:      Conjunctiva/sclera: Conjunctivae normal       Pupils: Pupils are equal, round, and reactive to light  Cardiovascular:      Rate and Rhythm: Normal rate and regular rhythm  Heart sounds: Normal heart sounds  Pulmonary:      Effort: Pulmonary effort is normal  No respiratory distress  Breath sounds: Normal breath sounds  Abdominal:      General: There is no distension  Palpations: Abdomen is soft  Tenderness: There is no abdominal tenderness  There is no guarding or rebound  Musculoskeletal:         General: Normal range of motion  Cervical back: Normal range of motion  Skin:     General: Skin is warm and dry  Neurological:      Mental Status: He is alert and oriented to person, place, and time  Cranial Nerves: No cranial nerve deficit  Psychiatric:         Behavior: Behavior normal          Thought Content: Thought content normal          Judgment: Judgment normal        BMI Counseling: Body mass index is 34 68 kg/m²  The BMI is above normal  Nutrition recommendations include encouraging healthy choices of fruits and vegetables  Exercise recommendations include moderate physical activity 150 minutes/week  Rationale for BMI follow-up plan is due to patient being overweight or obese  Depression Screening and Follow-up Plan: Patient was screened for depression during today's encounter   They screened negative with a PHQ-2 score of 0       "

## 2023-07-13 DIAGNOSIS — B89 PARASITE INFESTATION: ICD-10-CM

## 2023-07-13 RX ORDER — PERMETHRIN 50 MG/G
CREAM TOPICAL
Qty: 60 G | Refills: 0 | Status: SHIPPED | OUTPATIENT
Start: 2023-07-13

## 2023-08-30 ENCOUNTER — TELEPHONE (OUTPATIENT)
Dept: FAMILY MEDICINE CLINIC | Facility: CLINIC | Age: 43
End: 2023-08-30

## 2023-08-30 NOTE — TELEPHONE ENCOUNTER
----- Message from Mayte [place], [place] sent at 8/29/2023  3:28 PM EDT -----  Regarding: Referral  Contact: [phone]  See pt's note      ----- Message -----  From: Vernadine Oar  Sent: 8/29/2023   3:27 PM EDT  To: [facility]  Subject: Referral                                         Hello   I had previously been seen for patches of bumps all over my skin , you had prescribed steroid pills for it , and it did not help. I keep getting more and more patches of bumps. I called Friday and haven’t heard back.  You had stated if the steroids didn’t help that you will give me a referral to a dermatologist. Thank you

## 2023-08-31 DIAGNOSIS — L30.9 DERMATITIS: ICD-10-CM

## 2023-08-31 DIAGNOSIS — B89 PARASITE INFESTATION: Primary | ICD-10-CM

## 2023-08-31 NOTE — TELEPHONE ENCOUNTER
Left message for pt to return call re: referral to derm.   I want to give him phone #'s to other derms along with a referral to [facility]

## 2023-10-04 ENCOUNTER — TELEPHONE (OUTPATIENT)
Dept: DERMATOLOGY | Facility: CLINIC | Age: 43
End: 2023-10-04

## 2023-10-04 NOTE — TELEPHONE ENCOUNTER
Called pt to advise his upcoming appt with Dr. Ayan Dodge on 10/24 needs to be r/s, as provider is not in [facility] office on Tuesdays. Left a message for pt to call the office to reschedule this appt.

## 2023-10-06 DIAGNOSIS — N32.81 OAB (OVERACTIVE BLADDER): ICD-10-CM

## 2023-10-06 RX ORDER — OXYBUTYNIN CHLORIDE 5 MG/1
TABLET, EXTENDED RELEASE ORAL
Qty: 30 TABLET | Refills: 3 | Status: SHIPPED | OUTPATIENT
Start: 2023-10-06

## 2023-10-18 ENCOUNTER — OFFICE VISIT (OUTPATIENT)
Dept: DERMATOLOGY | Facility: CLINIC | Age: 43
End: 2023-10-18

## 2023-10-18 VITALS — TEMPERATURE: 97.2 F | HEIGHT: 67 IN | BODY MASS INDEX: 35.94 KG/M2 | WEIGHT: 229 LBS

## 2023-10-18 DIAGNOSIS — D48.5 NEOPLASM OF UNCERTAIN BEHAVIOR OF SKIN: Primary | ICD-10-CM

## 2023-10-18 DIAGNOSIS — L30.9 DERMATITIS: ICD-10-CM

## 2023-10-18 DIAGNOSIS — L29.9 PRURITUS: ICD-10-CM

## 2023-10-18 PROCEDURE — 88312 SPECIAL STAINS GROUP 1: CPT | Performed by: STUDENT IN AN ORGANIZED HEALTH CARE EDUCATION/TRAINING PROGRAM

## 2023-10-18 PROCEDURE — 88305 TISSUE EXAM BY PATHOLOGIST: CPT | Performed by: STUDENT IN AN ORGANIZED HEALTH CARE EDUCATION/TRAINING PROGRAM

## 2023-10-18 PROCEDURE — 88342 IMHCHEM/IMCYTCHM 1ST ANTB: CPT | Performed by: STUDENT IN AN ORGANIZED HEALTH CARE EDUCATION/TRAINING PROGRAM

## 2023-10-18 PROCEDURE — 88341 IMHCHEM/IMCYTCHM EA ADD ANTB: CPT | Performed by: STUDENT IN AN ORGANIZED HEALTH CARE EDUCATION/TRAINING PROGRAM

## 2023-10-18 PROCEDURE — 88313 SPECIAL STAINS GROUP 2: CPT | Performed by: STUDENT IN AN ORGANIZED HEALTH CARE EDUCATION/TRAINING PROGRAM

## 2023-10-18 NOTE — PATIENT INSTRUCTIONS
NEOPLASM OF UNCERTAIN BEHAVIOR OF SKIN    Assessment and Plan:  I have discussed with the patient that a sample of skin via a "skin biopsy” would be potentially helpful to further make a specific diagnosis under the microscope. Based on a thorough discussion of this condition and the management approach to it (including a comprehensive discussion of the known risks, side effects and potential benefits of treatment), the patient (family) agrees to implement the following specific plan:    Procedure:  Skin Biopsy. After a thorough discussion of treatment options and risk/benefits/alternatives (including but not limited to local pain, scarring, dyspigmentation, blistering, possible superinfection, and inability to confirm a diagnosis via histopathology), verbal and written consent were obtained and portion of the rash was biopsied for tissue sample. See below for consent that was obtained from patient and subsequent Procedure Note. Procedure Details     Patient informed of the risks (including bleeding,scaring and infection) and benefits of the procedure explained. Verbal and written informed consent obtained. The area was prepped and draped in the usual fashion. Anesthesia was obtained with 1% lidocaine with epinephrine. The skin was then stretched perpendicular to the skin tension lines and a punch biopsy to an appropriate sampling depth was obtained with a 4 mm punch with a forceps and iris scissors. Hemostasis was obtained with 4-0 Prolene x 2 sutures. Complications:  None      Specimen has been sent for review by Dermatopathology. Plan:  1. Instructed to keep the wound dry and covered for 24-48h and clean thereafter. 2. Warning signs of infection were reviewed. 3. Recommended that the patient use acetaminophen as needed for pain  4.  Sutures if any should be removed in 14 days      Standard post-procedure care has been explained and has been included in written form within the patient's copy of Informed Consent.

## 2023-10-18 NOTE — PROGRESS NOTES
[facility] Note     Patient Name: Fly Kahn  Encounter Date: 10/18/23    Have you been cared for by a [facility]ologist in the last 3 years and, if so, which description applies to you? NO. I am considered a "new" patient and must complete all patient intake questions. I am MALE/not capable of bearing children. REVIEW OF SYSTEMS:  Have you recently had or currently have any of the following? Recent fever or chills? No  Any non-healing wound? No   PAST MEDICAL HISTORY:  Have you personally ever had or currently have any of the following? If "YES," then please provide more detail. Skin cancer (such as Melanoma, Basal Cell Carcinoma, Squamous Cell Carcinoma? No  Tuberculosis, HIV/AIDS, Hepatitis B or C: No  Radiation Treatment No   HISTORY OF IMMUNOSUPPRESSION:   Do you have a history of any of the following:  Systemic Immunosuppression such as Diabetes, Biologic or Immunotherapy, Chemotherapy, Organ Transplantation, Bone Marrow Transplantation? No     Answering "YES" requires the addition of the dotphrase "IMMUNOSUPPRESSED" as the first diagnosis of the patient's visit. FAMILY HISTORY:  Any "first degree relatives" (parent, brother, sister, or child) with the following? Skin Cancer, Pancreatic or Other Cancer? No   PATIENT EXPERIENCE:    Do you want the Dermatologist to perform a COMPLETE skin exam today including a clinical examination under the "bra and underwear" areas? Yes  If necessary, do we have your permission to call and leave a detailed message on your Preferred Phone number that includes your specific medical information?   Yes      No Known Allergies   Current Outpatient Medications:     Mirabegron ER (Myrbetriq) 25 MG TB24, Take 25 mg by mouth daily at bedtime as needed (prostate symptoms), Disp: 30 tablet, Rfl: 1    Multiple Vitamin (MULTIVITAMIN) capsule, Take 1 capsule by mouth daily, Disp: , Rfl:     acetaminophen-codeine (TYLENOL #3) 300-30 mg per tablet, Take 1 tablet by mouth every 4 (four) hours as needed for moderate pain (Patient not taking: Reported on 1/2/2022), Disp: 15 tablet, Rfl: 0    albuterol (PROVENTIL HFA,VENTOLIN HFA) 90 mcg/act inhaler, Inhale 2 puffs every 6 (six) hours as needed for wheezing or shortness of breath (Patient not taking: Reported on 10/18/2023), Disp: 18 g, Rfl: 0    albuterol (Ventolin HFA) 90 mcg/act inhaler, Inhale 2 puffs every 4 (four) hours as needed for wheezing (Patient not taking: Reported on 11/7/2022), Disp: 18 g, Rfl: 0    clobetasol (TEMOVATE) 0.05 % cream, Apply topically 2 (two) times a day, Disp: 30 g, Rfl: 0    EPINEPHrine (EPIPEN) 0.3 mg/0.3 mL SOAJ, Inject 0.3 mL (0.3 mg total) into a muscle once for 1 dose, Disp: 0.6 mL, Rfl: 0    famotidine (PEPCID) 20 mg tablet, Take 1 tablet (20 mg total) by mouth 2 (two) times a day (Patient not taking: Reported on 1/2/2022), Disp: 30 tablet, Rfl: 0    fluticasone (FLONASE) 50 mcg/act nasal spray, 2 sprays into each nostril daily (Patient not taking: Reported on 6/26/2021), Disp: 16 g, Rfl: 0    hydrOXYzine HCL (ATARAX) 25 mg tablet, Take 1 tablet (25 mg total) by mouth daily at bedtime as needed (insomnia) (Patient not taking: Reported on 10/18/2023), Disp: 30 tablet, Rfl: 0    Mirabegron ER 50 MG TB24, Take 1 tablet (50 mg total) by mouth daily at bedtime as needed (OAB) (Patient not taking: Reported on 11/7/2022), Disp: 90 tablet, Rfl: 1    ondansetron (Zofran ODT) 4 mg disintegrating tablet, Take 1 tablet (4 mg total) by mouth every 8 (eight) hours as needed for nausea or vomiting (Patient not taking: Reported on 11/7/2022), Disp: 30 tablet, Rfl: 1    oxybutynin (DITROPAN-XL) 5 mg 24 hr tablet, TAKE 1 TABLET(5 MG) BY MOUTH DAILY (Patient not taking: Reported on 10/18/2023), Disp: 30 tablet, Rfl: 3    permethrin (ELIMITE) 5 % cream, APPLY TOPICALLY TO THE AFFECTED AREA 1 TIME FOR 1 DOSE (Patient not taking: Reported on 10/18/2023), Disp: 60 g, Rfl: 0    predniSONE 10 mg tablet, Take 5 tablets for 2 days then decrease by 1 tablet every 2 days until you complete the course with 1 tablet for 2 days (Patient not taking: Reported on 11/29/2022), Disp: 30 tablet, Rfl: 0    predniSONE 10 mg tablet, Take 4 tabs daily x3d then 3x3d then 2x3d then 1x3d then 0.5x3d then stop. (Patient not taking: Reported on 10/18/2023), Disp: 45 tablet, Rfl: 0    simethicone (MYLICON,GAS-X) 670 MG capsule, Take 1 capsule (180 mg total) by mouth every 6 (six) hours as needed (abdominal cramping) (Patient not taking: Reported on 6/26/2021), Disp: 30 capsule, Rfl: 1    triamcinolone (KENALOG) 0.1 % cream, Apply topically 2 (two) times a day (Patient not taking: Reported on 10/18/2023), Disp: 30 g, Rfl: 0    zolpidem (AMBIEN) 5 mg tablet, Bring to sleep study. If unable to sleep in 30 minutes take 1 pill. If unable to sleep 30 minutes after 1st dose take 2nd pill. (Patient not taking: Reported on 10/18/2023), Disp: 2 tablet, Rfl: 0          Whom besides the patient is providing clinical information about today's encounter? NO ADDITIONAL HISTORIAN (patient alone provided history)    Physical Exam and Assessment/Plan by Diagnosis:    NEOPLASM OF UNCERTAIN BEHAVIOR OF SKIN    Physical Exam:  (Anatomic Location); (Size and Morphological Description); (Differential Diagnosis):  Trunk and extremities; bilateral elbows and right dorsal foot with skin colored papules coalescing into plaques. 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   Based on a thorough discussion of this condition and the management approach to it (including a comprehensive discussion of the known risks, side effects and potential benefits of treatment), the patient (family) agrees to implement the following specific plan:    Procedure:  Skin Biopsy. After a thorough discussion of treatment options and risk/benefits/alternatives (including but not limited to local pain, scarring, dyspigmentation, blistering, possible superinfection, and inability to confirm a diagnosis via histopathology), verbal and written consent were obtained and portion of the rash was biopsied for tissue sample. See below for consent that was obtained from patient and subsequent Procedure Note. PROCEDURE NOTE:  PUNCH BIOPSY      Performing Physician:     Anatomic Location; Clinical Description with size (cm); Pre-Op Diagnosis:    ATTENTION:  DERMPATH GROUP    SPECIMEN A; Skin; Anatomic Location: left elbow; Procedure/Protocol: Skin Specimen (submit in [facility]):Punch Biopsy (when a punch biopsy tool is used; simple closure is included) (CPT 74649; each additional punch biopsy is CPT 91705)   37y.o. year old  Male with a Morphological Description: bilateral elbows and right dorsal foot with skin colored papules coalescing into plaques. No epidermal changes   Differential Diagnosis and/or Specific Clinical Question:granuloma annulare versus sarcoid      Anesthesia: 1% xylocaine with epi       Topical anesthesia: None       Indications: To indicate diagnosis and management plan. Procedure Details     Patient informed of the risks (including bleeding,scaring and infection) and benefits of the procedure explained. Verbal and written informed consent obtained. The area was prepped and draped in the usual fashion. Anesthesia was obtained with 1% lidocaine with epinephrine. The skin was then stretched perpendicular to the skin tension lines and a punch biopsy to an appropriate sampling depth was obtained with a 4 mm punch with a forceps and iris scissors. Hemostasis was obtained with 4-0 Prolene x 2 sutures. Complications:  None      Specimen has been sent for review by Dermatopathology. Plan:  1.  Instructed to keep the wound dry and covered for 24-48h and clean thereafter. 2. Warning signs of infection were reviewed. 3. Recommended that the patient use acetaminophen as needed for pain  4. Sutures if any should be removed in 14 days      Standard post-procedure care has been explained and has been included in written form within the patient's copy of Informed Consent. DERMATITIS ASSOCIATED WITH PRURITUS     Physical Exam:  Anatomic Location Affected:  bilateral elbows and right dorsal foot  Morphological Description:  skin colored papules coalescing into plaques       Assessment and Plan:  Based on a thorough discussion of this condition and the management approach to it (including a comprehensive discussion of the known risks, side effects and potential benefits of treatment), the patient (family) agrees to implement the following specific plan: Additional History of Present Condition:  patient notes rash is on and off; goes away with topical steroids but recurs; was prescribed Clobetasol . 05% cream , TAC 0.1 % cream , prednisone 10 mg, and permethrin cream x 2 treatments. Reports nothing helped.    Has videos of bugs on her skin       Scribe Attestation      I,:  Inga Beard am acting as a scribe while in the presence of the attending physician.:       I,:  Tram Clemente MD personally performed the services described in this documentation    as scribed in my presence.:

## 2023-10-24 ENCOUNTER — TELEPHONE (OUTPATIENT)
Age: 43
End: 2023-10-24

## 2023-10-24 NOTE — TELEPHONE ENCOUNTER
Per Dr. Dante Rogers phone call to patient to let him know that Dr. Dante Rogers would like him to return for OV on his suture removal visit 11/1 @ 1:30 for getting kenalog injections and would discuss bx at time of visit. Patient will await phone call to add on schedule Per Dr. Dante Rogers. Will contact MR at  to add patient to schedule.

## 2023-11-01 ENCOUNTER — OFFICE VISIT (OUTPATIENT)
Dept: DERMATOLOGY | Facility: CLINIC | Age: 43
End: 2023-11-01
Payer: COMMERCIAL

## 2023-11-01 VITALS — HEIGHT: 67 IN | WEIGHT: 229.1 LBS | TEMPERATURE: 97.5 F | BODY MASS INDEX: 35.96 KG/M2

## 2023-11-01 DIAGNOSIS — Z48.02 ENCOUNTER FOR REMOVAL OF SUTURES: Primary | ICD-10-CM

## 2023-11-01 DIAGNOSIS — L92.0 GRANULOMA ANNULARE: Primary | ICD-10-CM

## 2023-11-01 DIAGNOSIS — L29.9 PRURITUS: ICD-10-CM

## 2023-11-01 PROCEDURE — 11900 INJECT SKIN LESIONS </W 7: CPT | Performed by: DERMATOLOGY

## 2023-11-01 PROCEDURE — RECHECK: Performed by: DERMATOLOGY

## 2023-11-01 PROCEDURE — 99214 OFFICE O/P EST MOD 30 MIN: CPT | Performed by: DERMATOLOGY

## 2023-11-01 RX ORDER — CLOBETASOL PROPIONATE 0.5 MG/G
CREAM TOPICAL 2 TIMES DAILY
Qty: 60 G | Refills: 1 | Status: SHIPPED | OUTPATIENT
Start: 2023-11-01

## 2023-11-01 NOTE — PROGRESS NOTES
Suture removal    Date/Time: 11/1/2023 1:30 PM    Performed by: Sean Max  Authorized by: Radha Umana MD  Universal Protocol:  Consent: Verbal consent obtained. Risks and benefits: risks, benefits and alternatives were discussed  Consent given by: patient  Time out: Immediately prior to procedure a "time out" was called to verify the correct patient, procedure, equipment, support staff and site/side marked as required. Patient understanding: patient states understanding of the procedure being performed  Patient consent: the patient's understanding of the procedure matches consent given  Procedure consent: procedure consent matches procedure scheduled  Relevant documents: relevant documents present and verified  Test results: test results not available  Site marked: the operative site was not marked  Radiology Images displayed and confirmed. If images not available, report reviewed: imaging studies not available  Patient identity confirmed: verbally with patient      Patient location:  Clinic  Location:     Laterality:  Left    Location:  Upper extremity    Upper extremity location:  Elbow    Elbow location:  L elbow  Procedure details: Tools used:  Suture removal kit    Wound appearance:  No sign(s) of infection, good wound healing and clean    Number of sutures removed:  2  Post-procedure details:     Post-removal:  Band-Aid applied (vaseline applied)    Patient tolerance of procedure:   Tolerated well, no immediate complications

## 2023-11-01 NOTE — PATIENT INSTRUCTIONS
GRANULOMA ANNULARE    Assessment and Plan:  Based on a thorough discussion of this condition and the management approach to it (including a comprehensive discussion of the known risks, side effects and potential benefits of treatment), the patient (family) agrees to implement the following specific plan:  Discussed this is an autoimmune disease. Recommend applying Clobetasol % cream twice daily to affected areas. Discussed kenalog injections. Patient would like injections today. What is granuloma annulare? Granuloma annulare, also known as necrobiotic papulosis, is a non-contagious, benign skin condition that causes a rash. It is usually asymptomatic and can have a wide range of presentations on the skin. Most commonly, patients see a slightly raised patch, thickened, ring-shaped patch. These tend to be found on the hand, arm, foot, or leg, but can appear anywhere on the skin. It is more common in females than males. Interestingly, the perforating type of granuloma annulare most often affects people who live in [place]. What causes granuloma annulare? Most people who develop granuloma annulare are otherwise healthy. Children are most likely to get the localized, subcutaneous form, while older adults tend to develop generalized and perforating types. It is still unclear what exactly causes granuloma annulare, but there is evidence to suggest that it may follow skin injury, certain medications, and other diseases. The inflammation is mediated by tumor necrosis factor. Those who have HIV, lymphoma, thyroid disease, or diabetes may be especially prone. What are the signs and symptoms of granuloma annulare? Granuloma annulare can occur at any site of the body and can be quite widespread. It may by asymptomatic, but often quite tender if the area affected is bumped. The ring-like rash can be easily confused with tinea corporis, which is a type of fungal infection on the body.   In localized granuloma annulare, there is a raised ring-shaped patch on the skin. They are usually reddish in color, but can be pink, violet, or skin-colored. Bumps on the skin often develop prior, and join together to form the rash. Most common sites are the skin over joints, knuckles, backs of both hands, and on top of the foot. The center of each ring-like rash is often a little depression. Some people develop widespread bumps on their skin that grow together to form large patches in generalized granuloma annulare. These appear in many colors as widespread skin-colored, pinkish to slightly mauve-colored patches. Other types include:  Subcutaneous granuloma annulare. When this type develops, you often see a roundish, firm, and usually painless lump in the skin. You may have a single mass or clusters of lumps. Most lumps stay the same for months, but a lump can also grow quickly. The lumps tend to be flesh-colored, pink, or red, and look similar to rheumatoid nodules. Perforating granuloma annulare. This rare type usually develops on the hands and fingers. It causes small bumps that often feel scaly. The bumps may leak fluid, itch, or feel painful. Some people have a few bumps. Others develop widespread bumps that join together to form raised patches on the skin. When this type clears, it may leave scars on the skin. Patch granuloma annulare. When this rare type develops, you see one or more patches on the skin. The patches may be red, reddish brown, or violet. Some people have one or a few patches; others have widespread patches. How is granuloma annulare diagnosed? Granuloma annulare can usually be diagnosed simply based on its characteristic appearance. If diagnosis if unclear, a skin biopsy may be performed that shows degeneration of dermal collagen surrounded by an inflammatory reaction. Other testing may be done to rule out associated diseases such as thyroid or diabetes. How do we treat granuloma annulare?   Most people do not need treatment as the condition may clear by itself from a few months to years. If left alone, most rashes will clear within 2 years. If you develop perforating granuloma annulare, you may see scarring when the granuloma clears. If you have a few noticeable patches, these are often treated with:  Topical corticosteroids help reduce inflammation and clear the skin more quickly. Corticosteroid injections into affected skin patches to reduce the inflammation, which can help your skin clear more quickly. Topical calcineurin inhibitors such as tacrolimus and pimecrloimus  Cryotherapy freezes your skin, which can eliminate the raised patches  If the granuloma annulare covers much of your skin or you have a deep lump in your skin, you'll have different treatment options. For these types of granuloma annulare, you may need:  Imiquimod is a medication used to treat malaria that is also effective at clearing the rash of granuloma annulare. . In one study, patients started to see results after taking the medication for about 3 months. Light therapy: Exposing the skin with granuloma annulare to ultraviolet (UV) light in a controlled way can be helpful. Some people receive a type of light therapy called PUVA. This involves taking a medication called psoralen and then treating the skin with UVA light. The medication makes your skin more sensitive to light, so light therapy can be more effective. Another type of light therapy, laser treatments, can also be helpful. While granuloma annulare is not an infection, an antibiotic may help be helpful for some people to decrease inflammation.  Other options for widespread granuloma annulare that does not respond to other forms of treatment include:  Systemic steroids  Isotretinoin  Methotrexate  Potassium iodide  Dapsone  Hydroxychloroquine  Pentoxifylline  Allopurinol (note: allopurinol has also be cited as a cause of disseminated granuloma annulare)  Combination of antibiotics once monthly: rifampicin, ofloxacin, minocycline  Ciclosporin  Biologics particularly TNF? inhibitors such as adalimumab and infliximab       PROCEDURE:  INTRALESIONAL STEROID INJECTION (KENALOG INJECTION)    Purpose: Triamcinolone is a synthetic glucocorticoid corticosteroid that has marked anti-inflammatory action. It is prepared in sterile aqueous suspension suitable for injecting directly into a lesion on or immediately below the skin to treat a dermal inflammatory process. Indications: It is indicated for alopecia areata; inflammatory acne cysts; discoid lupus erythematosus; keloids and hypertrophic scars; inflammatory lesions of granuloma annulare, lichen planus, lichen simplex chronicus (neurodermatitis), psoriatic plaques, and other localized inflammatory skin conditions. Potential Side Effects: I understand that triamcinolone injection can potentially cause early and/or delayed adverse effects such as:    Pain    Impaired wound healing    Increased hair growth    Bleeding    White or brown marks    Steroid acne    Infection    Telangiectasia    Skin thinning    Cutaneous and subcutaneous lipoatrophy (most common) appearing as skin indentations or dimples around the injection sites a few weeks after treatment     PROCEDURE NOTE:  After verbal and written consent were obtained, the to-be-treated area was wiped and cleaned with rubbing alcohol 70%. Then, a total of 3 mL of Kenalog CONCENTRATION:  10 mg/mL diluted with saline to a final concentration of 5 mg/mL    (Lot# [id number]; Expiration 09/2024, NDC#: 4224-0297-51) was injected intralesionally into a total of 3 lesion/s on the following anatomic areas:   Left elbow, right elbow, right foot  using a 1-mL syringe and a 30-gauge needle. There was less than 1 mL of blood loss and little to no discomfort. The area was bandaged with a Band-aid. The patient tolerated the procedure well and remained in the office for observation.   With no signs of an adverse reaction, the patient was eventually discharged from clinic. Primary/Unscheduled

## 2023-11-01 NOTE — PROGRESS NOTES
[facility] Note     Patient Name: Pricilla Aguilar  Encounter Date: 11/01/2023     Have you been cared for by a [facility]ologist in the last 3 years and, if so, which description applies to you? Yes. I have been here within the last 3 years, and my medical history has NOT changed since that time. I am MALE/not capable of bearing children. REVIEW OF SYSTEMS:  Have you recently had or currently have any of the following? No changes in my recent health. PAST MEDICAL HISTORY:  Have you personally ever had or currently have any of the following? If "YES," then please provide more detail. No changes in my medical history. HISTORY OF IMMUNOSUPPRESSION: Do you have a history of any of the following:  Systemic Immunosuppression such as Diabetes, Biologic or Immunotherapy, Chemotherapy, Organ Transplantation, Bone Marrow Transplantation? No     Answering "YES" requires the addition of the dotphrase "IMMUNOSUPPRESSED" as the first diagnosis of the patient's visit. FAMILY HISTORY:  Any "first degree relatives" (parent, brother, sister, or child) with the following? No changes in my family's known health. PATIENT EXPERIENCE:    Do you want the Dermatologist to perform a COMPLETE skin exam today including a clinical examination under the "bra and underwear" areas? NO  If necessary, do we have your permission to call and leave a detailed message on your Preferred Phone number that includes your specific medical information?   Yes      No Known Allergies   Current Outpatient Medications:     acetaminophen-codeine (TYLENOL #3) 300-30 mg per tablet, Take 1 tablet by mouth every 4 (four) hours as needed for moderate pain (Patient not taking: Reported on 1/2/2022), Disp: 15 tablet, Rfl: 0    albuterol (PROVENTIL HFA,VENTOLIN HFA) 90 mcg/act inhaler, Inhale 2 puffs every 6 (six) hours as needed for wheezing or shortness of breath (Patient not taking: Reported on 10/18/2023), Disp: 18 g, Rfl: 0    albuterol (Ventolin HFA) 90 mcg/act inhaler, Inhale 2 puffs every 4 (four) hours as needed for wheezing (Patient not taking: Reported on 11/7/2022), Disp: 18 g, Rfl: 0    clobetasol (TEMOVATE) 0.05 % cream, Apply topically 2 (two) times a day, Disp: 30 g, Rfl: 0    EPINEPHrine (EPIPEN) 0.3 mg/0.3 mL SOAJ, Inject 0.3 mL (0.3 mg total) into a muscle once for 1 dose, Disp: 0.6 mL, Rfl: 0    famotidine (PEPCID) 20 mg tablet, Take 1 tablet (20 mg total) by mouth 2 (two) times a day (Patient not taking: Reported on 1/2/2022), Disp: 30 tablet, Rfl: 0    fluticasone (FLONASE) 50 mcg/act nasal spray, 2 sprays into each nostril daily (Patient not taking: Reported on 6/26/2021), Disp: 16 g, Rfl: 0    hydrOXYzine HCL (ATARAX) 25 mg tablet, Take 1 tablet (25 mg total) by mouth daily at bedtime as needed (insomnia) (Patient not taking: Reported on 10/18/2023), Disp: 30 tablet, Rfl: 0    Mirabegron ER (Myrbetriq) 25 MG TB24, Take 25 mg by mouth daily at bedtime as needed (prostate symptoms), Disp: 30 tablet, Rfl: 1    Mirabegron ER 50 MG TB24, Take 1 tablet (50 mg total) by mouth daily at bedtime as needed (OAB) (Patient not taking: Reported on 11/7/2022), Disp: 90 tablet, Rfl: 1    Multiple Vitamin (MULTIVITAMIN) capsule, Take 1 capsule by mouth daily, Disp: , Rfl:     ondansetron (Zofran ODT) 4 mg disintegrating tablet, Take 1 tablet (4 mg total) by mouth every 8 (eight) hours as needed for nausea or vomiting (Patient not taking: Reported on 11/7/2022), Disp: 30 tablet, Rfl: 1    oxybutynin (DITROPAN-XL) 5 mg 24 hr tablet, TAKE 1 TABLET(5 MG) BY MOUTH DAILY (Patient not taking: Reported on 10/18/2023), Disp: 30 tablet, Rfl: 3    permethrin (ELIMITE) 5 % cream, APPLY TOPICALLY TO THE AFFECTED AREA 1 TIME FOR 1 DOSE (Patient not taking: Reported on 10/18/2023), Disp: 60 g, Rfl: 0    predniSONE 10 mg tablet, Take 5 tablets for 2 days then decrease by 1 tablet every 2 days until you complete the course with 1 tablet for 2 days (Patient not taking: Reported on 11/29/2022), Disp: 30 tablet, Rfl: 0    predniSONE 10 mg tablet, Take 4 tabs daily x3d then 3x3d then 2x3d then 1x3d then 0.5x3d then stop. (Patient not taking: Reported on 10/18/2023), Disp: 45 tablet, Rfl: 0    simethicone (MYLICON,GAS-X) 707 MG capsule, Take 1 capsule (180 mg total) by mouth every 6 (six) hours as needed (abdominal cramping) (Patient not taking: Reported on 6/26/2021), Disp: 30 capsule, Rfl: 1    triamcinolone (KENALOG) 0.1 % cream, Apply topically 2 (two) times a day (Patient not taking: Reported on 10/18/2023), Disp: 30 g, Rfl: 0    zolpidem (AMBIEN) 5 mg tablet, Bring to sleep study. If unable to sleep in 30 minutes take 1 pill. If unable to sleep 30 minutes after 1st dose take 2nd pill. (Patient not taking: Reported on 10/18/2023), Disp: 2 tablet, Rfl: 0          Whom besides the patient is providing clinical information about today's encounter? NO ADDITIONAL HISTORIAN (patient alone provided history)    Physical Exam and Assessment/Plan by Diagnosis:      GRANULOMA ANNULARE  Pruritus     Physical Exam:  Anatomic Location Affected:  Bilateral elbows, right dorsal foot and abdomen  Morphological Description:  skin colored papules coalescing into plaques      Additional History of Present Condition:  Proven by punch biopsy 10/18/23    Assessment and Plan:  Based on a thorough discussion of this condition and the management approach to it (including a comprehensive discussion of the known risks, side effects and potential benefits of treatment), the patient (family) agrees to implement the following specific plan:  Discussed this is an autoimmune disease. Recommend applying Clobetasol 0.05 % cream twice daily to affected areas. Up to 2 weeks per month   Discussed kenalog injections. Patient would like injections today.          PROCEDURE:  INTRALESIONAL STEROID INJECTION (KENALOG INJECTION)    Purpose: Triamcinolone is a synthetic glucocorticoid corticosteroid that has marked anti-inflammatory action. It is prepared in sterile aqueous suspension suitable for injecting directly into a lesion on or immediately below the skin to treat a dermal inflammatory process. Indications: It is indicated for alopecia areata; inflammatory acne cysts; discoid lupus erythematosus; keloids and hypertrophic scars; inflammatory lesions of granuloma annulare, lichen planus, lichen simplex chronicus (neurodermatitis), psoriatic plaques, and other localized inflammatory skin conditions. Potential Side Effects: I understand that triamcinolone injection can potentially cause early and/or delayed adverse effects such as:    Pain    Impaired wound healing    Increased hair growth    Bleeding    White or brown marks    Steroid acne    Infection    Telangiectasia    Skin thinning    Cutaneous and subcutaneous lipoatrophy (most common) appearing as skin indentations or dimples around the injection sites a few weeks after treatment     PROCEDURE NOTE:  After verbal and written consent were obtained, the to-be-treated area was wiped and cleaned with rubbing alcohol 70%. Then, a total of 3 mL of Kenalog CONCENTRATION:  10 mg/mL diluted with saline to a final concentration of 5 mg/mL    (Lot# [id number]; Expiration 09/2024, NDC#: 8205-2772-48) was injected intralesionally into a total of >20 lesion/s on the following anatomic areas:   Left elbow, right elbow, right foot  using a 1-mL syringe and a 30-gauge needle. There was less than 1 mL of blood loss and little to no discomfort. The area was bandaged with a Band-aid. The patient tolerated the procedure well and remained in the office for observation. With no signs of an adverse reaction, the patient was eventually discharged from clinic.         Scribe Attestation      I,:  Prince Woods am acting as a scribe while in the presence of the attending physician.:       I,:  Rose Marie Parker MD personally performed the services described in this documentation    as scribed in my presence.:

## 2023-11-24 ENCOUNTER — AMB VIDEO VISIT (OUTPATIENT)
Dept: OTHER | Facility: HOSPITAL | Age: 43
End: 2023-11-24

## 2023-11-24 VITALS — TEMPERATURE: 98 F | RESPIRATION RATE: 20 BRPM

## 2023-11-24 DIAGNOSIS — J40 BRONCHITIS: Primary | ICD-10-CM

## 2023-11-24 PROBLEM — S62.666D: Status: RESOLVED | Noted: 2018-12-10 | Resolved: 2023-11-24

## 2023-11-24 PROBLEM — L23.7 POISON IVY: Status: RESOLVED | Noted: 2019-03-26 | Resolved: 2023-11-24

## 2023-11-24 PROBLEM — S62.666B OPEN NONDISPLACED FRACTURE OF DISTAL PHALANX OF RIGHT LITTLE FINGER: Status: RESOLVED | Noted: 2018-11-19 | Resolved: 2023-11-24

## 2023-11-24 PROBLEM — J06.9 ACUTE UPPER RESPIRATORY INFECTION: Status: RESOLVED | Noted: 2017-02-21 | Resolved: 2023-11-24

## 2023-11-24 PROBLEM — Z00.00 ROUTINE ADULT HEALTH MAINTENANCE: Status: RESOLVED | Noted: 2018-10-11 | Resolved: 2023-11-24

## 2023-11-24 PROCEDURE — ECARE PR SL URGENT CARE VIRTUAL VISIT: Performed by: PHYSICIAN ASSISTANT

## 2023-11-24 RX ORDER — BENZONATATE 200 MG/1
200 CAPSULE ORAL 3 TIMES DAILY PRN
Qty: 20 CAPSULE | Refills: 0 | Status: SHIPPED | OUTPATIENT
Start: 2023-11-24

## 2023-11-24 RX ORDER — METHYLPREDNISOLONE 4 MG/1
TABLET ORAL
Qty: 21 TABLET | Refills: 0 | Status: SHIPPED | OUTPATIENT
Start: 2023-11-24

## 2023-11-24 NOTE — PATIENT INSTRUCTIONS
Schedule a follow-up appointment with your primary care physician for recheck in 2-3 days. If you cannot see your PCP, you can schedule a follow up appointment at a [facility]. Go to the emergency department if you develop any new or worsening symptoms including shortness of breath, chest pain, or anything else that is concerning. Excuses can be found in "Letters" section of [employer]. Can print if opened from a [address] phone number is [phone] if you need assistance or have further questions    1 21 ) SUNIL ([phone])  Schedule or Reschedule Outpatient Testing - Option 2  Billing - Option 3  General Info - Option 4  [employer] Help - Option 5  Comprehensive Spine Program - Option 6   COVID - Option 7    Acute Bronchitis   WHAT YOU NEED TO KNOW:   Acute bronchitis is swelling and irritation in your lungs. It is usually caused by a virus and most often happens in the winter. Bronchitis may also be caused by bacteria or by a chemical irritant, such as smoke. DISCHARGE INSTRUCTIONS:   Return to the emergency department if:   You cough up blood. Your lips or fingernails turn blue. You feel like you are not getting enough air when you breathe. Call your doctor if:   Your symptoms do not go away or get worse, even after treatment. Your cough does not get better within 4 weeks. You have questions or concerns about your condition or care. Medicines: You may need any of the following:  Cough suppressants  decrease your urge to cough. Decongestants  help loosen mucus in your lungs and make it easier to cough up. This can help you breathe easier. Inhalers  may be given. Your healthcare provider may give you one or more inhalers to help you breathe easier and cough less. An inhaler gives you medicine to open your airways. Ask your healthcare provider to show you how to use your inhaler correctly.          Antiviral medicine  treats infections caused by a virus.    Antibiotics  may be given if your bronchitis is caused by bacteria or if you have lung condition. Acetaminophen  decreases pain and fever. It is available without a doctor's order. Ask how much to take and how often to take it. Follow directions. Read the labels of all other medicines you are using to see if they also contain acetaminophen, or ask your doctor or pharmacist. Acetaminophen can cause liver damage if not taken correctly. NSAIDs  help decrease swelling and pain or fever. This medicine is available with or without a doctor's order. NSAIDs can cause stomach bleeding or kidney problems in certain people. If you take blood thinner medicine, always ask your healthcare provider if NSAIDs are safe for you. Always read the medicine label and follow directions. Self-care:   Drink liquids as directed. You may need to drink more liquids than usual to stay hydrated. Ask how much liquid to drink each day and which liquids are best for you. Use a cool mist humidifier. This increases air moisture in your home. This may make it easier for you to breathe and help decrease your cough. Get more rest.  Rest helps your body to heal. Slowly start to do more each day. Rest when you feel it is needed. Prevent acute bronchitis:       Ask about vaccines you may need. Get a flu vaccine each year as soon as recommended, usually in September or October. Ask your healthcare provider if you should also get a pneumonia or COVID-19 vaccine. Your healthcare provider can tell you if you should also get other vaccines, and when to get them. Prevent the spread of germs. You can decrease your risk for acute bronchitis and other illnesses by doing the following:     Wash your hands often with soap and water. Carry germ-killing hand lotion or gel with you. You can use the lotion or gel to clean your hands when soap and water are not available.          Do not touch your eyes, nose, or mouth unless you have washed your hands first.    Always cover your mouth when you cough to prevent the spread of germs. It is best to cough into a tissue or your shirt sleeve instead of into your hand. Ask those around you to cover their mouths when they cough. Try to avoid people who have a cold or the flu. If you are sick, stay away from others as much as possible. Avoid irritants in the air. Avoid chemicals, fumes, and dust. Wear a face mask if you must work around dust or fumes. Stay inside on days when air pollution levels are high. If you have allergies, stay inside when pollen counts are high. Do not use aerosol products, such as spray-on deodorant, bug spray, and hair spray. Do not smoke or be around others who are smoking. Nicotine and other chemicals in cigarettes and cigars can cause lung damage. Ask your healthcare provider for information if you currently smoke and need help to quit. E-cigarettes or smokeless tobacco still contain nicotine. Talk to your healthcare provider before you use these products. Follow up with your doctor as directed:  Write down questions you have so you will remember to ask them during your follow-up visits. © Copyright [facility] 2023 Information is for End User's use only and may not be sold, redistributed or otherwise used for commercial purposes. The above information is an  only. It is not intended as medical advice for individual conditions or treatments. Talk to your doctor, nurse or pharmacist before following any medical regimen to see if it is safe and effective for you.

## 2023-11-24 NOTE — PROGRESS NOTES
Required Documentation:  Encounter provider Edith Lara PA-C    Provider located at [facility] [id number]    Identify all parties in room with patient during virtual visit:  significant other-permission granted and child(jesusita)- permission granted    The patient was identified by name and date of birth. [facility] was informed that this is a telemedicine visit and that the visit is being conducted through the [address] [place] [address] Dr platform. He agrees to proceed. .  My office door was closed. No one else was in the room. He acknowledged consent and understanding of privacy and security of the video platform. The patient has agreed to participate and understands they can discontinue the visit at any time. Poor connection. Switched to phone call    Verification of patient location:    Patient is located at home in the following state in which I hold an active license [place]    Patient is aware this is a billable service. Reason for visit is No chief complaint on file. Subjective  HPI   Reports cough x 1 week which is now productive. Reports now wheezing. Endorses sore throat. Reports chest tightness, pain in chest with coughing, SOB with forceful cough. Albuterol is not helping. Denies fevers. He has a hx of bronchitis. Denies SOB at rest or FERRELL. +sick contacts. COVID testing not completed. Past Medical History:   Diagnosis Date    Hypertension     Last assessed 2/21/2017        Past Surgical History:   Procedure Laterality Date    HERNIA REPAIR      KNEE SURGERY      Last assessed 2/21/2017         No Known Allergies    Review of Systems   Constitutional:  Negative for fever. HENT:  Negative for nosebleeds. Eyes:  Negative for redness. Respiratory:  Positive for cough, chest tightness, shortness of breath and wheezing. Cardiovascular:  Negative for chest pain.    Gastrointestinal:  Negative for blood in stool. Genitourinary:  Negative for hematuria. Musculoskeletal:  Negative for gait problem. Skin:  Negative for rash. Neurological:  Negative for seizures. Psychiatric/Behavioral:  Negative for behavioral problems. Video Exam    Vitals:    11/24/23 1220   Resp: 20   Temp: 98 °F (36.7 °C)       Physical Exam  Constitutional:       General: He is not in acute distress. Appearance: Normal appearance. He is normal weight. He is not ill-appearing or toxic-appearing. HENT:      Head: Normocephalic and atraumatic. Nose: No rhinorrhea. Comments: Sounds congested     Mouth/Throat:      Mouth: Mucous membranes are moist.   Eyes:      Conjunctiva/sclera: Conjunctivae normal.   Pulmonary:      Effort: Pulmonary effort is normal. No respiratory distress. Breath sounds: No wheezing (no gross audible wheeze through computer). Comments: Speaking clearly in full sentences  Musculoskeletal:      Cervical back: Normal range of motion. Skin:     Findings: No rash (on face or neck). Neurological:      Mental Status: He is alert. Cranial Nerves: No dysarthria or facial asymmetry. Psychiatric:         Mood and Affect: Mood normal.         Behavior: Behavior normal.         Visit Time  Total Visit Duration: 12 minutes    Assessment/Plan:    Diagnoses and all orders for this visit:    Bronchitis  -     methylPREDNISolone 4 MG tablet therapy pack; Use as directed on package  -     benzonatate (TESSALON) 200 MG capsule; Take 1 capsule (200 mg total) by mouth 3 (three) times a day as needed for cough        Patient Instructions   Schedule a follow-up appointment with your primary care physician for recheck in 2-3 days. If you cannot see your PCP, you can schedule a follow up appointment at a [facility]. Go to the emergency department if you develop any new or worsening symptoms including shortness of breath, chest pain, or anything else that is concerning.     Excuses can be found in "Letters" section of "[email or web address] roderick. Can print if opened from a [address] phone number is [phone] if you need assistance or have further questions    1 21 ) SUNIL ([phone])  Schedule or Reschedule Outpatient Testing - Option 2  Billing - Option 3  General Info - Option 4  MyChart Help - Option 5  Comprehensive Spine Program - Option 6   COVID - Option 7    Acute Bronchitis   WHAT YOU NEED TO KNOW:   Acute bronchitis is swelling and irritation in your lungs. It is usually caused by a virus and most often happens in the winter. Bronchitis may also be caused by bacteria or by a chemical irritant, such as smoke. DISCHARGE INSTRUCTIONS:   Return to the emergency department if:   You cough up blood. Your lips or fingernails turn blue. You feel like you are not getting enough air when you breathe. Call your doctor if:   Your symptoms do not go away or get worse, even after treatment. Your cough does not get better within 4 weeks. You have questions or concerns about your condition or care. Medicines: You may need any of the following:  Cough suppressants  decrease your urge to cough. Decongestants  help loosen mucus in your lungs and make it easier to cough up. This can help you breathe easier. Inhalers  may be given. Your healthcare provider may give you one or more inhalers to help you breathe easier and cough less. An inhaler gives you medicine to open your airways. Ask your healthcare provider to show you how to use your inhaler correctly. Antiviral medicine  treats infections caused by a virus. Antibiotics  may be given if your bronchitis is caused by bacteria or if you have lung condition. Acetaminophen  decreases pain and fever. It is available without a doctor's order. Ask how much to take and how often to take it. Follow directions.  Read the labels of all other medicines you are using to see if they also contain acetaminophen, or ask your doctor or pharmacist. Acetaminophen can cause liver damage if not taken correctly. NSAIDs  help decrease swelling and pain or fever. This medicine is available with or without a doctor's order. NSAIDs can cause stomach bleeding or kidney problems in certain people. If you take blood thinner medicine, always ask your healthcare provider if NSAIDs are safe for you. Always read the medicine label and follow directions. Self-care:   Drink liquids as directed. You may need to drink more liquids than usual to stay hydrated. Ask how much liquid to drink each day and which liquids are best for you. Use a cool mist humidifier. This increases air moisture in your home. This may make it easier for you to breathe and help decrease your cough. Get more rest.  Rest helps your body to heal. Slowly start to do more each day. Rest when you feel it is needed. Prevent acute bronchitis:       Ask about vaccines you may need. Get a flu vaccine each year as soon as recommended, usually in September or October. Ask your healthcare provider if you should also get a pneumonia or COVID-19 vaccine. Your healthcare provider can tell you if you should also get other vaccines, and when to get them. Prevent the spread of germs. You can decrease your risk for acute bronchitis and other illnesses by doing the following:     Wash your hands often with soap and water. Carry germ-killing hand lotion or gel with you. You can use the lotion or gel to clean your hands when soap and water are not available. Do not touch your eyes, nose, or mouth unless you have washed your hands first.    Always cover your mouth when you cough to prevent the spread of germs. It is best to cough into a tissue or your shirt sleeve instead of into your hand. Ask those around you to cover their mouths when they cough. Try to avoid people who have a cold or the flu. If you are sick, stay away from others as much as possible.     Avoid irritants in the air. Avoid chemicals, fumes, and dust. Wear a face mask if you must work around dust or fumes. Stay inside on days when air pollution levels are high. If you have allergies, stay inside when pollen counts are high. Do not use aerosol products, such as spray-on deodorant, bug spray, and hair spray. Do not smoke or be around others who are smoking. Nicotine and other chemicals in cigarettes and cigars can cause lung damage. Ask your healthcare provider for information if you currently smoke and need help to quit. E-cigarettes or smokeless tobacco still contain nicotine. Talk to your healthcare provider before you use these products. Follow up with your doctor as directed:  Write down questions you have so you will remember to ask them during your follow-up visits. © Copyright [employer] 2023 Information is for End User's use only and may not be sold, redistributed or otherwise used for commercial purposes. The above information is an  only. It is not intended as medical advice for individual conditions or treatments. Talk to your doctor, nurse or pharmacist before following any medical regimen to see if it is safe and effective for you.

## 2023-11-24 NOTE — CARE ANYWHERE EVISITS
Visit Summary for Olga Lidia Henderson . Eleanor Tom - Gender: Male - Date of Birth: [id number]  Date: [id number] - Duration: 7 minutes  Patient: Olga Lidia Henderson . Eleanor Tom  Provider: Karis Knox PA-C    Patient Contact Information  Address  [address] [facility]; [address] [place] [address]  [phone]    Visit Topics  Cold [Added By: Self - 2023-11-24]    Triage Questions   What is your current physical address in the event of a medical emergency? Answer []  Are you allergic to any medications? Answer []  Are you now or could you be pregnant? Answer []  Do you have any immune system compromise or chronic lung   disease? Answer []  Do you have any vulnerable family members in the home (infant, pregnant, cancer, elderly)? Answer []     Conversation Transcripts  [0A][0A] [Notification] You are connected with Karis Knox PA-C, Urgent Care Specialist.[0A][Notification] Bob Shafer is located in [place]. [0A][Notification] Bob Shafer has shared health history. Aym Rdz .[0A]    Diagnosis  Acute bronchitis    Procedures  Value: [id number] Code: CPT-4 UNLISTED E&M SERVICE    Medications Prescribed    No prescriptions ordered    Electronically signed by: Brionna Lopez(NPI [id number])

## 2024-03-01 DIAGNOSIS — N32.81 OAB (OVERACTIVE BLADDER): ICD-10-CM

## 2024-03-01 RX ORDER — OXYBUTYNIN CHLORIDE 5 MG/1
5 TABLET, EXTENDED RELEASE ORAL DAILY
Qty: 90 TABLET | Refills: 1 | Status: SHIPPED | OUTPATIENT
Start: 2024-03-01

## 2024-03-27 ENCOUNTER — OFFICE VISIT (OUTPATIENT)
Dept: FAMILY MEDICINE CLINIC | Facility: CLINIC | Age: 44
End: 2024-03-27
Payer: COMMERCIAL

## 2024-03-27 VITALS
SYSTOLIC BLOOD PRESSURE: 136 MMHG | OXYGEN SATURATION: 98 % | BODY MASS INDEX: 35.47 KG/M2 | DIASTOLIC BLOOD PRESSURE: 87 MMHG | HEIGHT: 67 IN | TEMPERATURE: 96.9 F | HEART RATE: 79 BPM | WEIGHT: 226 LBS

## 2024-03-27 DIAGNOSIS — G89.29 CHRONIC PAIN OF LEFT KNEE: ICD-10-CM

## 2024-03-27 DIAGNOSIS — E66.01 SEVERE OBESITY (BMI 35.0-39.9) WITH COMORBIDITY (HCC): ICD-10-CM

## 2024-03-27 DIAGNOSIS — M25.562 CHRONIC PAIN OF LEFT KNEE: ICD-10-CM

## 2024-03-27 DIAGNOSIS — R06.2 WHEEZING: ICD-10-CM

## 2024-03-27 DIAGNOSIS — Z30.09 STERILIZATION CONSULT: Primary | ICD-10-CM

## 2024-03-27 DIAGNOSIS — J06.9 ACUTE UPPER RESPIRATORY INFECTION: ICD-10-CM

## 2024-03-27 PROCEDURE — 99214 OFFICE O/P EST MOD 30 MIN: CPT | Performed by: FAMILY MEDICINE

## 2024-03-27 RX ORDER — ALBUTEROL SULFATE 90 UG/1
2 AEROSOL, METERED RESPIRATORY (INHALATION) EVERY 6 HOURS PRN
Qty: 18 G | Refills: 0 | Status: SHIPPED | OUTPATIENT
Start: 2024-03-27

## 2024-03-27 RX ORDER — AMOXICILLIN 500 MG/1
500 TABLET, FILM COATED ORAL 3 TIMES DAILY
Qty: 21 TABLET | Refills: 0 | Status: SHIPPED | OUTPATIENT
Start: 2024-03-27 | End: 2024-04-03

## 2024-03-28 ENCOUNTER — HOSPITAL ENCOUNTER (OUTPATIENT)
Dept: RADIOLOGY | Facility: HOSPITAL | Age: 44
Discharge: HOME | End: 2024-03-28
Payer: COMMERCIAL

## 2024-03-28 ENCOUNTER — TELEPHONE (OUTPATIENT)
Age: 44
End: 2024-03-28

## 2024-03-28 DIAGNOSIS — M25.562 CHRONIC PAIN OF LEFT KNEE: ICD-10-CM

## 2024-03-28 DIAGNOSIS — G89.29 CHRONIC PAIN OF LEFT KNEE: ICD-10-CM

## 2024-03-28 PROCEDURE — 73562 X-RAY EXAM OF KNEE 3: CPT

## 2024-03-28 NOTE — TELEPHONE ENCOUNTER
Pt wife called in stating pt has as referral for his knee physical therapy how to get appt gave her the central scheduling number. Thanks

## 2024-04-01 PROBLEM — E66.01 SEVERE OBESITY (BMI 35.0-39.9) WITH COMORBIDITY (HCC): Status: ACTIVE | Noted: 2024-04-01

## 2024-04-01 NOTE — PROGRESS NOTES
Assessment/Plan:    42 y/o male with: Acute URI, wheezing, with chronic knee pain, severe obesity with comorbidity along with referral for vasectomy. Will refer to urology. Will continue meds. And add antibiotic along with inhaler. Discussed supportive care and return parameters.     No problem-specific Assessment & Plan notes found for this encounter.       Diagnoses and all orders for this visit:    Sterilization consult  -     Ambulatory Referral to Urology; Future    Acute upper respiratory infection  -     amoxicillin (AMOXIL) 500 MG tablet; Take 1 tablet (500 mg total) by mouth 3 (three) times a day for 7 days    Wheezing  -     albuterol (PROVENTIL HFA,VENTOLIN HFA) 90 mcg/act inhaler; Inhale 2 puffs every 6 (six) hours as needed for wheezing or shortness of breath    Chronic pain of left knee  -     Cancel: XR knee 4+ vw left injury; Future  -     Ambulatory Referral to Physical Therapy; Future    Severe obesity (BMI 35.0-39.9) with comorbidity (HCC)          Subjective:     Chief Complaint   Patient presents with    Cold Like Symptoms     Patient is complaining of sore throat, fever, and body aches since Saturday.     Vasectomy     Patient is looking to get a referral so he can get a vasectomy, no further concerns, ng        Patient ID: Sheldon Harding is a 43 y.o. male.    Patient is a 42 y/o male who presents for follow-up on Acute URI, wheezing, with chronic knee pain, severe obesity with comorbidity. No fevers chills nausea or vomiting, some cough and congestion. Pt also requests referral for vasectomy.         The following portions of the patient's history were reviewed and updated as appropriate: allergies, current medications, past family history, past medical history, past social history, past surgical history and problem list.    Review of Systems   Constitutional: Negative.    HENT:  Positive for congestion and sinus pressure.    Eyes: Negative.    Respiratory:  Positive for cough and wheezing.   "  Cardiovascular: Negative.    Gastrointestinal: Negative.    Endocrine: Negative.    Genitourinary: Negative.    Musculoskeletal:  Positive for arthralgias.   Allergic/Immunologic: Negative.    Neurological: Negative.    Hematological: Negative.    Psychiatric/Behavioral: Negative.     All other systems reviewed and are negative.        Objective:      /87 (BP Location: Right arm, Patient Position: Sitting, Cuff Size: Large)   Pulse 79   Temp (!) 96.9 °F (36.1 °C) (Temporal)   Ht 5' 7\" (1.702 m)   Wt 103 kg (226 lb)   SpO2 98%   BMI 35.40 kg/m²          Physical Exam  Constitutional:       Appearance: He is well-developed.   HENT:      Head: Atraumatic.      Right Ear: External ear normal.      Left Ear: External ear normal.   Eyes:      Conjunctiva/sclera: Conjunctivae normal.      Pupils: Pupils are equal, round, and reactive to light.   Cardiovascular:      Rate and Rhythm: Normal rate and regular rhythm.      Heart sounds: Normal heart sounds.   Pulmonary:      Effort: Pulmonary effort is normal. No respiratory distress.      Breath sounds: Wheezing present.   Abdominal:      General: There is no distension.      Palpations: Abdomen is soft.      Tenderness: There is no abdominal tenderness. There is no guarding or rebound.   Musculoskeletal:         General: Normal range of motion.      Cervical back: Normal range of motion.   Skin:     General: Skin is warm and dry.   Neurological:      Mental Status: He is alert and oriented to person, place, and time.      Cranial Nerves: No cranial nerve deficit.   Psychiatric:         Behavior: Behavior normal.         Thought Content: Thought content normal.         Judgment: Judgment normal.         "

## 2024-04-15 ENCOUNTER — OFFICE VISIT (OUTPATIENT)
Dept: UROLOGY | Facility: CLINIC | Age: 44
End: 2024-04-15
Payer: COMMERCIAL

## 2024-04-15 ENCOUNTER — APPOINTMENT (EMERGENCY)
Dept: CT IMAGING | Facility: HOSPITAL | Age: 44
End: 2024-04-15
Payer: COMMERCIAL

## 2024-04-15 ENCOUNTER — HOSPITAL ENCOUNTER (EMERGENCY)
Facility: HOSPITAL | Age: 44
Discharge: HOME | End: 2024-04-15
Attending: EMERGENCY MEDICINE
Payer: COMMERCIAL

## 2024-04-15 VITALS
HEART RATE: 81 BPM | TEMPERATURE: 97.7 F | DIASTOLIC BLOOD PRESSURE: 85 MMHG | SYSTOLIC BLOOD PRESSURE: 129 MMHG | OXYGEN SATURATION: 94 % | BODY MASS INDEX: 34.7 KG/M2 | RESPIRATION RATE: 20 BRPM | WEIGHT: 221.56 LBS

## 2024-04-15 VITALS
HEIGHT: 67 IN | DIASTOLIC BLOOD PRESSURE: 78 MMHG | WEIGHT: 223 LBS | HEART RATE: 100 BPM | SYSTOLIC BLOOD PRESSURE: 126 MMHG | OXYGEN SATURATION: 95 % | BODY MASS INDEX: 35 KG/M2

## 2024-04-15 DIAGNOSIS — Z30.2 ENCOUNTER FOR STERILIZATION: Primary | ICD-10-CM

## 2024-04-15 DIAGNOSIS — R93.5 ABNORMAL CT OF THE ABDOMEN: Primary | ICD-10-CM

## 2024-04-15 DIAGNOSIS — Z30.09 STERILIZATION CONSULT: ICD-10-CM

## 2024-04-15 DIAGNOSIS — R10.9 ABDOMINAL PAIN: ICD-10-CM

## 2024-04-15 LAB
ALBUMIN SERPL BCP-MCNC: 4.4 G/DL (ref 3.5–5)
ALP SERPL-CCNC: 43 U/L (ref 34–104)
ALT SERPL W P-5'-P-CCNC: 35 U/L (ref 7–52)
ANION GAP SERPL CALCULATED.3IONS-SCNC: 11 MMOL/L (ref 4–13)
AST SERPL W P-5'-P-CCNC: 28 U/L (ref 13–39)
ATRIAL RATE: 66 BPM
BASOPHILS # BLD AUTO: 0.04 THOUSANDS/ÂΜL (ref 0–0.1)
BASOPHILS NFR BLD AUTO: 1 % (ref 0–1)
BILIRUB SERPL-MCNC: 0.28 MG/DL (ref 0.2–1)
BUN SERPL-MCNC: 10 MG/DL (ref 5–25)
CALCIUM SERPL-MCNC: 9.5 MG/DL (ref 8.4–10.2)
CHLORIDE SERPL-SCNC: 101 MMOL/L (ref 96–108)
CO2 SERPL-SCNC: 28 MMOL/L (ref 21–32)
CREAT SERPL-MCNC: 0.89 MG/DL (ref 0.6–1.3)
EOSINOPHIL # BLD AUTO: 0.04 THOUSAND/ÂΜL (ref 0–0.61)
EOSINOPHIL NFR BLD AUTO: 1 % (ref 0–6)
ERYTHROCYTE [DISTWIDTH] IN BLOOD BY AUTOMATED COUNT: 13 % (ref 11.6–15.1)
GFR SERPL CREATININE-BSD FRML MDRD: 104 ML/MIN/1.73SQ M
GLUCOSE SERPL-MCNC: 142 MG/DL (ref 65–140)
HCT VFR BLD AUTO: 39.9 % (ref 36.5–49.3)
HGB BLD-MCNC: 13.8 G/DL (ref 12–17)
IMM GRANULOCYTES # BLD AUTO: 0.02 THOUSAND/UL (ref 0–0.2)
IMM GRANULOCYTES NFR BLD AUTO: 0 % (ref 0–2)
LIPASE SERPL-CCNC: 16 U/L (ref 11–82)
LYMPHOCYTES # BLD AUTO: 1.35 THOUSANDS/ÂΜL (ref 0.6–4.47)
LYMPHOCYTES NFR BLD AUTO: 16 % (ref 14–44)
MCH RBC QN AUTO: 30.3 PG (ref 26.8–34.3)
MCHC RBC AUTO-ENTMCNC: 34.6 G/DL (ref 31.4–37.4)
MCV RBC AUTO: 88 FL (ref 82–98)
MONOCYTES # BLD AUTO: 0.32 THOUSAND/ÂΜL (ref 0.17–1.22)
MONOCYTES NFR BLD AUTO: 4 % (ref 4–12)
NEUTROPHILS # BLD AUTO: 6.84 THOUSANDS/ÂΜL (ref 1.85–7.62)
NEUTS SEG NFR BLD AUTO: 78 % (ref 43–75)
NRBC BLD AUTO-RTO: 0 /100 WBCS
P AXIS: 80 DEGREES
PLATELET # BLD AUTO: 381 THOUSANDS/UL (ref 149–390)
PMV BLD AUTO: 9.7 FL (ref 8.9–12.7)
POTASSIUM SERPL-SCNC: 4.2 MMOL/L (ref 3.5–5.3)
PR INTERVAL: 174 MS
PROT SERPL-MCNC: 7.9 G/DL (ref 6.4–8.4)
QRS AXIS: 61 DEGREES
QRSD INTERVAL: 92 MS
QT INTERVAL: 392 MS
QTC INTERVAL: 410 MS
RBC # BLD AUTO: 4.56 MILLION/UL (ref 3.88–5.62)
SODIUM SERPL-SCNC: 140 MMOL/L (ref 135–147)
T WAVE AXIS: 62 DEGREES
VENTRICULAR RATE: 66 BPM
WBC # BLD AUTO: 8.61 THOUSAND/UL (ref 4.31–10.16)

## 2024-04-15 PROCEDURE — 93010 ELECTROCARDIOGRAM REPORT: CPT

## 2024-04-15 PROCEDURE — 93005 ELECTROCARDIOGRAM TRACING: CPT

## 2024-04-15 PROCEDURE — 99285 EMERGENCY DEPT VISIT HI MDM: CPT

## 2024-04-15 PROCEDURE — 99204 OFFICE O/P NEW MOD 45 MIN: CPT | Performed by: UROLOGY

## 2024-04-15 PROCEDURE — 80053 COMPREHEN METABOLIC PANEL: CPT

## 2024-04-15 PROCEDURE — 74177 CT ABD & PELVIS W/CONTRAST: CPT

## 2024-04-15 PROCEDURE — NC001 PR NO CHARGE: Performed by: SURGERY

## 2024-04-15 PROCEDURE — 99284 EMERGENCY DEPT VISIT MOD MDM: CPT

## 2024-04-15 PROCEDURE — 96375 TX/PRO/DX INJ NEW DRUG ADDON: CPT

## 2024-04-15 PROCEDURE — 83690 ASSAY OF LIPASE: CPT

## 2024-04-15 PROCEDURE — 96374 THER/PROPH/DIAG INJ IV PUSH: CPT

## 2024-04-15 PROCEDURE — 85025 COMPLETE CBC W/AUTO DIFF WBC: CPT

## 2024-04-15 PROCEDURE — 36415 COLL VENOUS BLD VENIPUNCTURE: CPT

## 2024-04-15 PROCEDURE — 96361 HYDRATE IV INFUSION ADD-ON: CPT

## 2024-04-15 RX ORDER — ONDANSETRON 2 MG/ML
4 INJECTION INTRAMUSCULAR; INTRAVENOUS ONCE
Status: COMPLETED | OUTPATIENT
Start: 2024-04-15 | End: 2024-04-15

## 2024-04-15 RX ORDER — ALPRAZOLAM 1 MG/1
1 TABLET ORAL ONCE
Qty: 1 TABLET | Refills: 0 | Status: SHIPPED | OUTPATIENT
Start: 2024-04-15 | End: 2024-04-15

## 2024-04-15 RX ORDER — KETOROLAC TROMETHAMINE 30 MG/ML
15 INJECTION, SOLUTION INTRAMUSCULAR; INTRAVENOUS ONCE
Status: COMPLETED | OUTPATIENT
Start: 2024-04-15 | End: 2024-04-15

## 2024-04-15 RX ADMIN — KETOROLAC TROMETHAMINE 15 MG: 30 INJECTION, SOLUTION INTRAMUSCULAR; INTRAVENOUS at 03:10

## 2024-04-15 RX ADMIN — SODIUM CHLORIDE 1000 ML: 0.9 INJECTION, SOLUTION INTRAVENOUS at 03:08

## 2024-04-15 RX ADMIN — ONDANSETRON 4 MG: 2 INJECTION INTRAMUSCULAR; INTRAVENOUS at 03:10

## 2024-04-15 RX ADMIN — IOHEXOL 100 ML: 350 INJECTION, SOLUTION INTRAVENOUS at 04:24

## 2024-04-15 NOTE — PROGRESS NOTES
ASSESSMENT:     43 y.o. male  with desire for vasectomy    PLAN:       The patient is status post vasectomy. I recommended rest, ice, and scrotal support. The patient has been counseled that unless he is having major issues, he will not return for a post vasectomy check. He will contact us with any significant swelling bruising or concerns for underlying infection. I have prescribed Norco to take as needed. The patient understands that he is not yet considered sterile. We will contact him with results of his I have recommended semen analyses at 8 weeks post procedure. In the interim I have recommended contraception to avoid an undesired pregnancy. Patient will need to ejaculate 20-25 times prior to semen analysis to ensure accurate specimen. We have given patient information for scheduling of the 8 week post vasectomy semen testing.      Patient was in the emergency room today for cholecystitis.  Cholecystectomy will be scheduled.  I will recommend waiting at least a month after treatment before we consider office-based vasectomy to allow for convalescence        ----          UROLOGY NEW CONSULT NOTE     CHIEF COMPLAINT   Sheldon Harding is a 43 y.o. male with a complaint of   Chief Complaint   Patient presents with    New Patient Visit    Vasectomy    Consult       History of Present Illness:   Sheldon Harding is a 43 y.o. male here for vasectomy evaluation.  Patient has 4 children at home.  Patient is interested in elective sterilization.  Presents with his partner.  No prior surgery to groin or testicles although the patient does have a right-sided epididymal cyst.  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  Patient was seen in the emergency room this morning for cholecystitis.  Plan for upcoming cholecystectomy is underway.        Past Medical History:     Past Medical History:   Diagnosis Date    Hypertension     Last assessed 2/21/2017        PAST SURGICAL HISTORY:      Past Surgical History:   Procedure Laterality Date    HERNIA REPAIR      KNEE SURGERY      Last assessed 2/21/2017        CURRENT MEDICATIONS:     Current Outpatient Medications   Medication Sig Dispense Refill    albuterol (PROVENTIL HFA,VENTOLIN HFA) 90 mcg/act inhaler Inhale 2 puffs every 6 (six) hours as needed for wheezing or shortness of breath 18 g 0    albuterol (Ventolin HFA) 90 mcg/act inhaler Inhale 2 puffs every 4 (four) hours as needed for wheezing 18 g 0    ALPRAZolam (XANAX) 1 mg tablet Take 1 tablet (1 mg total) by mouth 1 (one) time for 1 dose Take one hour prior to procedure, must have  to and from 1 tablet 0    benzonatate (TESSALON) 200 MG capsule Take 1 capsule (200 mg total) by mouth 3 (three) times a day as needed for cough 20 capsule 0    clobetasol (TEMOVATE) 0.05 % cream Apply topically 2 (two) times a day 60 g 1    methylPREDNISolone 4 MG tablet therapy pack Use as directed on package 21 tablet 0    Mirabegron ER (Myrbetriq) 25 MG TB24 Take 25 mg by mouth daily at bedtime as needed (prostate symptoms) 30 tablet 1    Multiple Vitamin (MULTIVITAMIN) capsule Take 1 capsule by mouth daily      oxybutynin (DITROPAN-XL) 5 mg 24 hr tablet Take 1 tablet (5 mg total) by mouth daily 90 tablet 1    EPINEPHrine (EPIPEN) 0.3 mg/0.3 mL SOAJ Inject 0.3 mL (0.3 mg total) into a muscle once for 1 dose 0.6 mL 0     No current facility-administered medications for this visit.       ALLERGIES:   No Known Allergies    SOCIAL HISTORY:     Social History     Socioeconomic History    Marital status: Single     Spouse name: None    Number of children: 3    Years of education: None    Highest education level: None   Occupational History    None   Tobacco Use    Smoking status: Never    Smokeless tobacco: Never   Vaping Use    Vaping status: Never Used   Substance and Sexual Activity    Alcohol use: Yes     Comment: 2-3x/week    Drug use: No    Sexual activity: Yes     Partners: Female   Other Topics  "Concern    None   Social History Narrative    None     Social Determinants of Health     Financial Resource Strain: Not on file   Food Insecurity: Not on file   Transportation Needs: Not on file   Physical Activity: Not on file   Stress: Not on file   Social Connections: Not on file   Intimate Partner Violence: Not on file   Housing Stability: Not on file       SOCIAL HISTORY:     Family History   Problem Relation Age of Onset    Heart attack Father     Hypertension Father     Breast cancer Family     Breast cancer Family     Colon cancer Neg Hx        REVIEW OF SYSTEMS:     Review of Systems   Constitutional:  Positive for fatigue. Negative for chills and fever.   HENT:  Negative for ear pain and sore throat.    Eyes:  Negative for pain and visual disturbance.   Respiratory:  Negative for cough and shortness of breath.    Cardiovascular:  Negative for chest pain and palpitations.   Gastrointestinal:  Positive for abdominal pain. Negative for vomiting.   Genitourinary:  Positive for scrotal swelling. Negative for dysuria and hematuria.   Musculoskeletal:  Negative for arthralgias and back pain.   Skin:  Negative for color change and rash.   Neurological:  Negative for seizures and syncope.   All other systems reviewed and are negative.        PHYSICAL EXAM:     /78 (BP Location: Right arm, Patient Position: Sitting, Cuff Size: Adult)   Pulse 100   Ht 5' 7\" (1.702 m)   Wt 101 kg (223 lb)   SpO2 95%   BMI 34.93 kg/m²     Physical Exam  Vitals reviewed.   Constitutional:       General: He is not in acute distress.     Appearance: He is well-developed.   HENT:      Head: Normocephalic and atraumatic.   Eyes:      Pupils: Pupils are equal, round, and reactive to light.   Cardiovascular:      Rate and Rhythm: Normal rate.   Pulmonary:      Effort: Pulmonary effort is normal. No respiratory distress.      Breath sounds: Normal breath sounds.   Abdominal:      General: There is no distension.      Palpations: " Abdomen is soft.      Tenderness: There is abdominal tenderness.   Genitourinary:     Penis: Normal.       Testes: Normal.      Comments: RIGHT epididymal cyst  Musculoskeletal:         General: Normal range of motion.      Cervical back: Normal range of motion and neck supple.   Skin:     General: Skin is warm and dry.   Neurological:      Mental Status: He is alert and oriented to person, place, and time.   Psychiatric:         Behavior: Behavior normal.         LABS:     CBC:   Lab Results   Component Value Date    WBC 8.61 04/15/2024    HGB 13.8 04/15/2024    HCT 39.9 04/15/2024    MCV 88 04/15/2024     04/15/2024       BMP:   Lab Results   Component Value Date    CALCIUM 9.5 04/15/2024     03/23/2017    K 4.2 04/15/2024    CO2 28 04/15/2024     04/15/2024    BUN 10 04/15/2024    CREATININE 0.89 04/15/2024       PROCEDURE:     NONE

## 2024-04-15 NOTE — ED PROVIDER NOTES
History  Chief Complaint   Patient presents with    Abdominal Pain     Pt complains of upper mid abdominal pain with nausea and vomiting. Pt took 600mg ibuprofen around 0000, but immediately vomited after. Pt admits to drinking 3 shots of tequila earlier tonight.      43-year-old male with PMH of HTN, borderline HLD presents for evaluation of abdominal pain.  This began abruptly tonight about 2.5 hours PTA.  Localizes to epigastrium and RUQ, does not radiate to lower abdomen or to back.  Sharp and constant in nature.  Patient tried to take ibuprofen PTA but immediately vomited.  Still complaining of nausea.  Denies any fevers, chills, hematemesis, diarrhea, hematochezia, dysuria, constipation, chest pain, SOB.  Admits to drinking about 3 shots of tequila earlier tonight, this was approximately 10 hours PTA.  No new or abnormal foods.  No history of gastrointestinal problems or abdominal surgeries.        Prior to Admission Medications   Prescriptions Last Dose Informant Patient Reported? Taking?   EPINEPHrine (EPIPEN) 0.3 mg/0.3 mL SOAJ   No No   Sig: Inject 0.3 mL (0.3 mg total) into a muscle once for 1 dose   Mirabegron ER (Myrbetriq) 25 MG TB24   No No   Sig: Take 25 mg by mouth daily at bedtime as needed (prostate symptoms)   Multiple Vitamin (MULTIVITAMIN) capsule  Self Yes No   Sig: Take 1 capsule by mouth daily   albuterol (PROVENTIL HFA,VENTOLIN HFA) 90 mcg/act inhaler   No No   Sig: Inhale 2 puffs every 6 (six) hours as needed for wheezing or shortness of breath   albuterol (Ventolin HFA) 90 mcg/act inhaler  Self No No   Sig: Inhale 2 puffs every 4 (four) hours as needed for wheezing   benzonatate (TESSALON) 200 MG capsule   No No   Sig: Take 1 capsule (200 mg total) by mouth 3 (three) times a day as needed for cough   clobetasol (TEMOVATE) 0.05 % cream   No No   Sig: Apply topically 2 (two) times a day   methylPREDNISolone 4 MG tablet therapy pack   No No   Sig: Use as directed on package   oxybutynin  (DITROPAN-XL) 5 mg 24 hr tablet   No No   Sig: Take 1 tablet (5 mg total) by mouth daily      Facility-Administered Medications: None       Past Medical History:   Diagnosis Date    Hypertension     Last assessed 2/21/2017        Past Surgical History:   Procedure Laterality Date    HERNIA REPAIR      KNEE SURGERY      Last assessed 2/21/2017        Family History   Problem Relation Age of Onset    Heart attack Father     Hypertension Father     Breast cancer Family     Breast cancer Family     Colon cancer Neg Hx      I have reviewed and agree with the history as documented.    E-Cigarette/Vaping    E-Cigarette Use Never User      E-Cigarette/Vaping Substances    Nicotine No     THC No     CBD No     Flavoring No     Other No     Unknown No      Social History     Tobacco Use    Smoking status: Never    Smokeless tobacco: Never   Vaping Use    Vaping status: Never Used   Substance Use Topics    Alcohol use: Yes     Comment: 2-3x/week    Drug use: No       Review of Systems   Constitutional:  Negative for chills and fever.   HENT:  Negative for ear pain and sore throat.    Eyes:  Negative for pain and visual disturbance.   Respiratory:  Negative for cough and shortness of breath.    Cardiovascular:  Negative for chest pain and palpitations.   Gastrointestinal:  Positive for abdominal pain, nausea and vomiting.   Genitourinary:  Negative for dysuria and hematuria.   Musculoskeletal:  Negative for arthralgias and back pain.   Skin:  Negative for color change and rash.   Neurological:  Negative for seizures and syncope.   All other systems reviewed and are negative.      Physical Exam  Physical Exam  Vitals and nursing note reviewed.   Constitutional:       General: He is in acute distress.      Appearance: He is well-developed.   HENT:      Head: Normocephalic and atraumatic.   Eyes:      Conjunctiva/sclera: Conjunctivae normal.   Cardiovascular:      Rate and Rhythm: Normal rate and regular rhythm.      Heart sounds:  No murmur heard.  Pulmonary:      Effort: Pulmonary effort is normal. No respiratory distress.      Breath sounds: Normal breath sounds.   Abdominal:      Palpations: Abdomen is soft.      Tenderness: There is abdominal tenderness (minimally reproducible) in the right upper quadrant and epigastric area.   Musculoskeletal:         General: No swelling.      Cervical back: Neck supple.   Skin:     General: Skin is warm and dry.      Capillary Refill: Capillary refill takes less than 2 seconds.   Neurological:      Mental Status: He is alert.   Psychiatric:         Mood and Affect: Mood normal.         Vital Signs  ED Triage Vitals [04/15/24 0224]   Temperature Pulse Respirations Blood Pressure SpO2   97.7 °F (36.5 °C) 77 20 149/99 99 %      Temp Source Heart Rate Source Patient Position - Orthostatic VS BP Location FiO2 (%)   Oral Monitor Sitting Right arm --      Pain Score       10 - Worst Possible Pain           Vitals:    04/15/24 0330 04/15/24 0500 04/15/24 0530 04/15/24 0600   BP: 134/90 149/90 144/83 134/87   Pulse: 72 81 80 74   Patient Position - Orthostatic VS: Lying Lying Lying Lying         Visual Acuity      ED Medications  Medications   ketorolac (TORADOL) injection 15 mg (15 mg Intravenous Given 4/15/24 0310)   ondansetron (ZOFRAN) injection 4 mg (4 mg Intravenous Given 4/15/24 0310)   sodium chloride 0.9 % bolus 1,000 mL (0 mL Intravenous Stopped 4/15/24 0551)   iohexol (OMNIPAQUE) 350 MG/ML injection (MULTI-DOSE) 100 mL (100 mL Intravenous Given 4/15/24 0424)       Diagnostic Studies  Results Reviewed       Procedure Component Value Units Date/Time    Comprehensive metabolic panel [[id number]]  (Abnormal) Collected: 04/15/24 0258    Lab Status: Final result Specimen: Blood from Arm, Left Updated: 04/15/24 0334     Sodium 140 mmol/L      Potassium 4.2 mmol/L      Chloride 101 mmol/L      CO2 28 mmol/L      ANION GAP 11 mmol/L      BUN 10 mg/dL      Creatinine 0.89 mg/dL      Glucose 142 mg/dL       Calcium 9.5 mg/dL      AST 28 U/L      ALT 35 U/L      Alkaline Phosphatase 43 U/L      Total Protein 7.9 g/dL      Albumin 4.4 g/dL      Total Bilirubin 0.28 mg/dL      eGFR 104 ml/min/1.73sq m     Narrative:      [employer] Kid[employer] guidelines for Chronic Kidney Disease (CKD):     Stage 1 with normal or high GFR (GFR > 90 mL/min/1.73 square meters)    Stage 2 Mild CKD (GFR = 60-89 mL/min/1.73 square meters)    Stage 3A Moderate CKD (GFR = 45-59 mL/min/1.73 square meters)    Stage 3B Moderate CKD (GFR = 30-44 mL/min/1.73 square meters)    Stage 4 Severe CKD (GFR = 15-29 mL/min/1.73 square meters)    Stage 5 End Stage CKD (GFR <15 mL/min/1.73 square meters)  Note: GFR calculation is accurate only with a steady state creatinine    Lipase [[id number]]  (Normal) Collected: 04/15/24 0258    Lab Status: Final result Specimen: Blood from Arm, Left Updated: 04/15/24 0334     Lipase 16 u/L     CBC and differential [[id number]]  (Abnormal) Collected: 04/15/24 0258    Lab Status: Final result Specimen: Blood from Arm, Left Updated: 04/15/24 0318     WBC 8.61 Thousand/uL      RBC 4.56 Million/uL      Hemoglobin 13.8 g/dL      Hematocrit 39.9 %      MCV 88 fL      MCH 30.3 pg      MCHC 34.6 g/dL      RDW 13.0 %      MPV 9.7 fL      Platelets 381 Thousands/uL      nRBC 0 /100 WBCs      Segmented % 78 %      Immature Grans % 0 %      Lymphocytes % 16 %      Monocytes % 4 %      Eosinophils Relative 1 %      Basophils Relative 1 %      Absolute Neutrophils 6.84 Thousands/µL      Absolute Immature Grans 0.02 Thousand/uL      Absolute Lymphocytes 1.35 Thousands/µL      Absolute Monocytes 0.32 Thousand/µL      Eosinophils Absolute 0.04 Thousand/µL      Basophils Absolute 0.04 Thousands/µL                    CT abdomen pelvis with contrast   Final Result by Janet Morris MD (04/15 0502)      Cholelithiasis with mild diffuse gallbladder wall thickening and pericholecystic fatty haziness suspicious for acute  cholecystitis. Recommend correlation with right upper quadrant ultrasound.      Hepatic steatosis.      The study was marked in EPIC for immediate notification.         Workstation performed: [id number]         US right upper quadrant    (Results Pending)              Procedures  Procedures         ED Course  ED Course as of 04/15/24 0604   Mon Apr 15, 2024   0543 CT demonstrated findings concerning for acute cholecystitis.  Patient educated on results, he is agreeable to staying for follow-up RUQ ultrasound.  Order placed.  If ultrasound findings are consistent with acute cholecystitis will need surgical evaluation.  Patient symptoms have improved substantially with medications.                               SBIRT 22yo+      Flowsheet Row Most Recent Value   Initial Alcohol Screen: US AUDIT-C     1. How often do you have a drink containing alcohol? 0 Filed at: 04/15/2024 0224   2. How many drinks containing alcohol do you have on a typical day you are drinking?  0 Filed at: 04/15/2024 0224   3a. Male UNDER 65: How often do you have five or more drinks on one occasion? 0 Filed at: 04/15/2024 0224   Audit-C Score 0 Filed at: 04/15/2024 0224   GABBY: How many times in the past year have you...    Used an illegal drug or used a prescription medication for non-medical reasons? Never Filed at: 04/15/2024 0224                      Medical Decision Making  43-year-old male presents for evaluation of abrupt onset upper abdominal pain, nausea, and vomiting.  On exam patient appears highly uncomfortable, voluntarily guarding his upper abdomen; abdominal pain minimally reproducible with palpation.  Workup: CBC, CMP, lipase, CT abdomen pelvis with contrast.  Management: IV fluids, Toradol, Zofran.    Patient feeling much better following medications.  Labs all unremarkable.  CT demonstrated cholelithiasis with mild diffuse gallbladder wall thickening and pericholecystic fatty haziness suspicious for acute cholecystitis,  recommended correlation with RUQ ultrasound.  Discussed results with patient and explained the CT implications and need for follow-up ultrasound, he is agreeable to staying for ultrasound in the morning.  If ultrasound consistent with cholecystitis patient will need a surgical evaluation in the ED, otherwise may be able to consider outpatient consultation.      Patient signed out to Shanae Hood PA-C.  Pending RUQ ultrasound and possible surgical consult.    Amount and/or Complexity of Data Reviewed  Labs: ordered.  Radiology: ordered.    Risk  Prescription drug management.             Disposition  Final diagnoses:   None     ED Disposition       None          Follow-up Information    None         Patient's Medications   Discharge Prescriptions    No medications on file       No discharge procedures on file.    PDMP Review         Value Time User    PDMP Reviewed  Yes 6/26/2021 11:23 AM Jaguar Lakhani PA-C            ED Provider  Electronically Signed by             Curly Cowan PA-C  04/15/24 0613

## 2024-04-15 NOTE — ED NOTES
Patient able to eat the crackers and drink the water. Pt reports no abdominal pain or nausea after PO challenge.     Isa Parry RN  04/15/24 0750

## 2024-04-15 NOTE — ED CARE HANDOFF
Emergency Department Sign Out Note        Sign out and transfer of care from [facility]. See Separate Emergency Department note.     The patient, Sheldon Harding, was evaluated by the previous provider for [facility].    Workup Completed:  Labs / CT  Normal LFS    ED Course / Workup Pending (followup):  Surgery saw pt in consult - to po challenge and dc pantera e- no US needed can have out pt follow up   discussed with Maude CABRERA and Brandt Thompson - no US needed. Follow up out pt.  Pt has no pain, eating and drinking well here  Discussed close follow up.  Discussed reasons to return.                                   ED Course as of 04/15/24 1449   Mon Apr 15, 2024   0610 Signed to myself pending US   0639 Pain controlled,   Discussed with surgery   0717 Seen by surgery - can follow up out pt with DR Schwarz - no US needed today     Procedures  Medical Decision Making  Spoke with surgery - they saw pt - pt to dc home and close follow up     Amount and/or Complexity of Data Reviewed  Independent Historian: spouse  External Data Reviewed: notes.  Labs: ordered.  Radiology: ordered.    Risk  Prescription drug management.  Decision regarding hospitalization.  Emergency major surgery.            Disposition  Final diagnoses:   Abnormal CT of the abdomen   Abdominal pain     Time reflects when diagnosis was documented in both MDM as applicable and the Disposition within this note       Time User Action Codes Description Comment    4/15/2024  6:32 AM Irina Hood [R93.5] Abnormal CT of the abdomen     4/15/2024  7:32 AM Irina Hood [R10.9] Abdominal pain           ED Disposition       ED Disposition   Discharge    Condition   Stable    Date/Time   Mon Apr 15, 2024  7:31 AM    Comment   Sheldon Harding discharge to home/self care.                   Follow-up Information       Follow up With Specialties Details Why Contact Info    Good Lam MD General Surgery, Wound Care Schedule an appointment  as soon as possible for a visit Please call office to make an appointment as soon as you are able to discuss gallbladder removal surgery (cholecystectomy) [address] [facility] [address]  [facility] [address]  [phone]      Juan Arzola MD Family Medicine   [address] [place] [id number]  [phone]            Discharge Medication List as of 4/15/2024  7:33 AM        CONTINUE these medications which have NOT CHANGED    Details   !! albuterol (PROVENTIL HFA,VENTOLIN HFA) 90 mcg/act inhaler Inhale 2 puffs every 6 (six) hours as needed for wheezing or shortness of breath, Starting Wed 3/27/2024, Normal      !! albuterol (Ventolin HFA) 90 mcg/act inhaler Inhale 2 puffs every 4 (four) hours as needed for wheezing, Starting Fri 8/5/2022, Normal      benzonatate (TESSALON) 200 MG capsule Take 1 capsule (200 mg total) by mouth 3 (three) times a day as needed for cough, Starting Fri 11/24/2023, Normal      clobetasol (TEMOVATE) 0.05 % cream Apply topically 2 (two) times a day, Starting Wed 11/1/2023, Normal      EPINEPHrine (EPIPEN) 0.3 mg/0.3 mL SOAJ Inject 0.3 mL (0.3 mg total) into a muscle once for 1 dose, Starting Fri 6/23/2023, Normal      methylPREDNISolone 4 MG tablet therapy pack Use as directed on package, Normal      Mirabegron ER (Myrbetriq) 25 MG TB24 Take 25 mg by mouth daily at bedtime as needed (prostate symptoms), Starting Fri 6/23/2023, Normal      Multiple Vitamin (MULTIVITAMIN) capsule Take 1 capsule by mouth daily, Historical Med      oxybutynin (DITROPAN-XL) 5 mg 24 hr tablet Take 1 tablet (5 mg total) by mouth daily, Starting Fri 3/1/2024, Normal       !! - Potential duplicate medications found. Please discuss with provider.        No discharge procedures on file.       ED Provider  Electronically Signed by     Irina Hood PA-C  04/15/24 8299

## 2024-04-15 NOTE — Clinical Note
Sheldon Harding was seen and treated in our emergency department on 4/15/2024.                Diagnosis:     Sheldon  .    He may return on this date: 04/17/2024         If you have any questions or concerns, please don't hesitate to call.      Penny Hess, DO    ______________________________           _______________          _______________  Hospital Representative                              Date                                Time

## 2024-04-15 NOTE — CONSULTS
Consultation - General Surgery   Sheldon Harding 43 y.o. male MRN: [id number]  Unit/Bed#: ED-14 Encounter: [id number]    Assessment/Plan     Assessment:  43M w/ symptomatic cholelithiasis    AVSS  WBC 8.61  LFTS WNL    CTAP-Gallstones, mild wall thickening  Plan:  -No clinical evidence of acute cholecystitis. Pain has now completely resolved, no need for US  -PO trial if tolerates can be discharged and follow up as an outpatient for elective cholecystectomy which patient prefers  -Return precautions given including worsening pain, fevers, nausea, or vomiting. Patient understands    History of Present Illness     HPI:  Sheldon Harding is a 43 y.o. male who presented with epigastric pain that started around 11:30 pm. Pain was located in the epigastric region and migrated to the RUQ. Has never had this pain in the past. Associated with nausea, no vomiting. No fevers/chills. Denies any chest pain or sob. Pain has now completely resolved. No previous abd surgeries. No AC/AP.    Consult to surgery general  Consult performed by: Deejay Thompson DO  Consult ordered by: Irina Hood PA-C          Review of Systems   Constitutional:  Negative for chills and fever.   HENT: Negative.     Eyes: Negative.    Respiratory: Negative.     Cardiovascular: Negative.    Gastrointestinal:  Positive for abdominal pain and nausea. Negative for vomiting.   Endocrine: Negative.    Genitourinary: Negative.    Musculoskeletal: Negative.    Skin: Negative.    Allergic/Immunologic: Negative.    Neurological: Negative.    Hematological: Negative.    Psychiatric/Behavioral: Negative.         Historical Information   Past Medical History:   Diagnosis Date    Hypertension     Last assessed 2/21/2017      Past Surgical History:   Procedure Laterality Date    HERNIA REPAIR      KNEE SURGERY      Last assessed 2/21/2017      Social History   Social History     Substance and Sexual Activity   Alcohol Use Yes    Comment: 2-3x/week     Social  History     Substance and Sexual Activity   Drug Use No     E-Cigarette/Vaping    E-Cigarette Use Never User      E-Cigarette/Vaping Substances    Nicotine No     THC No     CBD No     Flavoring No     Other No     Unknown No      Social History     Tobacco Use   Smoking Status Never   Smokeless Tobacco Never     Family History:   Family History   Problem Relation Age of Onset    Heart attack Father     Hypertension Father     Breast cancer Family     Breast cancer Family     Colon cancer Neg Hx        Meds/Allergies   all current active meds have been reviewed  No Known Allergies    Objective   First Vitals:   Blood Pressure: 149/99 (04/15/24 0224)  Pulse: 77 (04/15/24 0224)  Temperature: 97.7 °F (36.5 °C) (04/15/24 0224)  Temp Source: Oral (04/15/24 0224)  Respirations: 20 (04/15/24 0224)  Weight - Scale: 101 kg (221 lb 9 oz) (04/15/24 0224)  SpO2: 99 % (04/15/24 0224)    Current Vitals:   Blood Pressure: 129/85 (04/15/24 0700)  Pulse: 81 (04/15/24 0700)  Temperature: 97.7 °F (36.5 °C) (04/15/24 0224)  Temp Source: Oral (04/15/24 0224)  Respirations: 20 (04/15/24 0700)  Weight - Scale: 101 kg (221 lb 9 oz) (04/15/24 0224)  SpO2: 94 % (04/15/24 0700)      Intake/Output Summary (Last 24 hours) at 4/15/2024 0719  Last data filed at 4/15/2024 0551  Gross per 24 hour   Intake 1000 ml   Output --   Net 1000 ml       Invasive Devices       Peripheral Intravenous Line  Duration             Peripheral IV 04/15/24 Right Antecubital <1 day                    Physical Exam  General: NAD  HENT: NCAT MMM  Neck: supple, no JVD  CV: nl rate  Lungs: nl wob. No resp distress  ABD: Soft, nontender, nondistended  Extrem: No CCE  Neuro: AAOx3    Lab Results: I have personally reviewed pertinent lab results.  , CBC:   Lab Results   Component Value Date    WBC 8.61 04/15/2024    HGB 13.8 04/15/2024    HCT 39.9 04/15/2024    MCV 88 04/15/2024     04/15/2024    RBC 4.56 04/15/2024    MCH 30.3 04/15/2024    MCHC 34.6 04/15/2024     "RDW 13.0 04/15/2024    MPV 9.7 04/15/2024    NRBC 0 04/15/2024   , CMP:   Lab Results   Component Value Date    SODIUM 140 04/15/2024    K 4.2 04/15/2024     04/15/2024    CO2 28 04/15/2024    BUN 10 04/15/2024    CREATININE 0.89 04/15/2024    CALCIUM 9.5 04/15/2024    AST 28 04/15/2024    ALT 35 04/15/2024    ALKPHOS 43 04/15/2024    EGFR 104 04/15/2024   , Coagulation: No results found for: \"PT\", \"INR\", \"APTT\"  Imaging: I have personally reviewed pertinent films in PACS and I have personally reviewed pertinent films in PACS with a Radiologist.  EKG, Pathology, and Other Studies: I have personally reviewed pertinent films in PACS      "
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
## 2024-04-23 ENCOUNTER — OFFICE VISIT (OUTPATIENT)
Dept: SURGERY | Facility: CLINIC | Age: 44
End: 2024-04-23
Payer: COMMERCIAL

## 2024-04-23 VITALS
SYSTOLIC BLOOD PRESSURE: 119 MMHG | HEIGHT: 67 IN | HEART RATE: 92 BPM | WEIGHT: 218.8 LBS | BODY MASS INDEX: 34.34 KG/M2 | DIASTOLIC BLOOD PRESSURE: 82 MMHG

## 2024-04-23 DIAGNOSIS — K92.1 HEMATOCHEZIA: ICD-10-CM

## 2024-04-23 DIAGNOSIS — K80.20 CALCULUS OF GALLBLADDER WITHOUT CHOLECYSTITIS WITHOUT OBSTRUCTION: Primary | ICD-10-CM

## 2024-04-23 PROCEDURE — 99243 OFF/OP CNSLTJ NEW/EST LOW 30: CPT | Performed by: PHYSICIAN ASSISTANT

## 2024-04-23 RX ORDER — SODIUM CHLORIDE, SODIUM LACTATE, POTASSIUM CHLORIDE, CALCIUM CHLORIDE 600; 310; 30; 20 MG/100ML; MG/100ML; MG/100ML; MG/100ML
125 INJECTION, SOLUTION INTRAVENOUS CONTINUOUS
OUTPATIENT
Start: 2024-06-13

## 2024-04-23 RX ORDER — INDOCYANINE GREEN AND WATER 25 MG
7.5 KIT INJECTION ONCE
OUTPATIENT
Start: 2024-06-13 | End: 2024-06-13

## 2024-05-01 PROBLEM — J06.9 ACUTE UPPER RESPIRATORY INFECTION: Status: RESOLVED | Noted: 2024-03-27 | Resolved: 2024-05-01

## 2024-06-04 NOTE — PRE-PROCEDURE INSTRUCTIONS
Pre-Surgery Instructions:   Medication Instructions    albuterol (PROVENTIL HFA,VENTOLIN HFA) 90 mcg/act inhaler Uses PRN- OK to take day of surgery    albuterol (Ventolin HFA) 90 mcg/act inhaler Uses PRN- OK to take day of surgery    EPINEPHrine (EPIPEN) 0.3 mg/0.3 mL SOAJ Uses PRN- OK to take day of surgery    Multiple Vitamin (MULTIVITAMIN) capsule Stop taking 7 days prior to surgery.    oxybutynin (DITROPAN-XL) 5 mg 24 hr tablet Take night before surgery    Medication instructions for day surgery reviewed. Please use only a sip of water to take your instructed medications. Avoid all over the counter vitamins, supplements and NSAIDS for one week prior to surgery per anesthesia guidelines. Tylenol is ok to take as needed.     You will receive a call one business day prior to surgery with an arrival time and hospital directions. If your surgery is scheduled on a Monday, the hospital will be calling you on the Friday prior to your surgery. If you have not heard from anyone by 8pm, please call the hospital supervisor through the hospital  at [phone]. ([place] [phone] or [place] [phone]).    Do not eat or drink anything after midnight the night before your surgery, including candy, mints, lifesavers, or chewing gum. Do not drink alcohol 24hrs before your surgery. Try not to smoke at least 24hrs before your surgery.       Follow the pre surgery showering instructions as listed in the “My Surgical Experience Booklet” or otherwise provided by your surgeon's office. Do not use a blade to shave the surgical area 1 week before surgery. It is okay to use a clean electric clippers up to 24 hours before surgery. Do not apply any lotions, creams, including makeup, cologne, deodorant, or perfumes after showering on the day of your surgery. Do not use dry shampoo, hair spray, hair gel, or any type of hair products.     No contact lenses, eye make-up, or artificial eyelashes. Remove nail polish, including  gel polish, and any artificial, gel, or acrylic nails if possible. Remove all jewelry including rings and body piercing jewelry.     Wear causal clothing that is easy to take on and off. Consider your type of surgery.    Keep any valuables, jewelry, piercings at home. Please bring any specially ordered equipment (sling, braces) if indicated.    Arrange for a responsible person to drive you to and from the hospital on the day of your surgery. Please confirm the visitor policy for the day of your procedure when you receive your phone call with an arrival time.     Call the surgeon's office with any new illnesses, exposures, or additional questions prior to surgery.    Please reference your “My Surgical Experience Booklet” for additional information to prepare for your upcoming surgery.

## 2024-06-11 ENCOUNTER — ANESTHESIA EVENT (OUTPATIENT)
Dept: PERIOP | Facility: HOSPITAL | Age: 44
End: 2024-06-11
Payer: COMMERCIAL

## 2024-06-13 ENCOUNTER — ANESTHESIA (OUTPATIENT)
Dept: PERIOP | Facility: HOSPITAL | Age: 44
End: 2024-06-13
Payer: COMMERCIAL

## 2024-06-13 ENCOUNTER — HOSPITAL ENCOUNTER (OUTPATIENT)
Facility: HOSPITAL | Age: 44
Setting detail: OUTPATIENT SURGERY
Discharge: HOME | End: 2024-06-13
Attending: SURGERY | Admitting: SURGERY
Payer: COMMERCIAL

## 2024-06-13 VITALS
HEART RATE: 79 BPM | RESPIRATION RATE: 14 BRPM | BODY MASS INDEX: 34.01 KG/M2 | SYSTOLIC BLOOD PRESSURE: 124 MMHG | DIASTOLIC BLOOD PRESSURE: 72 MMHG | TEMPERATURE: 98 F | WEIGHT: 217.15 LBS | OXYGEN SATURATION: 94 %

## 2024-06-13 DIAGNOSIS — K80.20 CALCULUS OF GALLBLADDER WITHOUT CHOLECYSTITIS WITHOUT OBSTRUCTION: ICD-10-CM

## 2024-06-13 PROBLEM — J45.909 ASTHMA: Status: ACTIVE | Noted: 2024-06-13

## 2024-06-13 PROCEDURE — 47562 LAPAROSCOPIC CHOLECYSTECTOMY: CPT | Performed by: SURGERY

## 2024-06-13 PROCEDURE — 47562 LAPAROSCOPIC CHOLECYSTECTOMY: CPT | Performed by: PHYSICIAN ASSISTANT

## 2024-06-13 PROCEDURE — S2900 ROBOTIC SURGICAL SYSTEM: HCPCS | Performed by: SURGERY

## 2024-06-13 PROCEDURE — 88304 TISSUE EXAM BY PATHOLOGIST: CPT | Performed by: PATHOLOGY

## 2024-06-13 PROCEDURE — NC001 PR NO CHARGE: Performed by: SURGERY

## 2024-06-13 RX ORDER — OXYCODONE HYDROCHLORIDE 5 MG/1
5 TABLET ORAL EVERY 4 HOURS PRN
Status: DISCONTINUED | OUTPATIENT
Start: 2024-06-13 | End: 2024-06-13 | Stop reason: HOSPADM

## 2024-06-13 RX ORDER — FENTANYL CITRATE/PF 50 MCG/ML
25 SYRINGE (ML) INJECTION
Status: DISCONTINUED | OUTPATIENT
Start: 2024-06-13 | End: 2024-06-13

## 2024-06-13 RX ORDER — MIDAZOLAM HYDROCHLORIDE 2 MG/2ML
INJECTION, SOLUTION INTRAMUSCULAR; INTRAVENOUS AS NEEDED
Status: DISCONTINUED | OUTPATIENT
Start: 2024-06-13 | End: 2024-06-13

## 2024-06-13 RX ORDER — HYDROMORPHONE HCL/PF 1 MG/ML
0.5 SYRINGE (ML) INJECTION
Status: DISCONTINUED | OUTPATIENT
Start: 2024-06-13 | End: 2024-06-13

## 2024-06-13 RX ORDER — ONDANSETRON 2 MG/ML
4 INJECTION INTRAMUSCULAR; INTRAVENOUS ONCE AS NEEDED
Status: DISCONTINUED | OUTPATIENT
Start: 2024-06-13 | End: 2024-06-13

## 2024-06-13 RX ORDER — ROCURONIUM BROMIDE 10 MG/ML
INJECTION, SOLUTION INTRAVENOUS AS NEEDED
Status: DISCONTINUED | OUTPATIENT
Start: 2024-06-13 | End: 2024-06-13

## 2024-06-13 RX ORDER — CEFAZOLIN SODIUM 2 G/50ML
2000 SOLUTION INTRAVENOUS ONCE
Status: COMPLETED | OUTPATIENT
Start: 2024-06-13 | End: 2024-06-13

## 2024-06-13 RX ORDER — SODIUM CHLORIDE, SODIUM LACTATE, POTASSIUM CHLORIDE, CALCIUM CHLORIDE 600; 310; 30; 20 MG/100ML; MG/100ML; MG/100ML; MG/100ML
125 INJECTION, SOLUTION INTRAVENOUS CONTINUOUS
Status: DISCONTINUED | OUTPATIENT
Start: 2024-06-13 | End: 2024-06-13 | Stop reason: HOSPADM

## 2024-06-13 RX ORDER — DEXAMETHASONE SODIUM PHOSPHATE 10 MG/ML
INJECTION, SOLUTION INTRAMUSCULAR; INTRAVENOUS AS NEEDED
Status: DISCONTINUED | OUTPATIENT
Start: 2024-06-13 | End: 2024-06-13

## 2024-06-13 RX ORDER — FENTANYL CITRATE 50 UG/ML
INJECTION, SOLUTION INTRAMUSCULAR; INTRAVENOUS AS NEEDED
Status: DISCONTINUED | OUTPATIENT
Start: 2024-06-13 | End: 2024-06-13

## 2024-06-13 RX ORDER — PROPOFOL 10 MG/ML
INJECTION, EMULSION INTRAVENOUS CONTINUOUS PRN
Status: DISCONTINUED | OUTPATIENT
Start: 2024-06-13 | End: 2024-06-13

## 2024-06-13 RX ORDER — MAGNESIUM HYDROXIDE 1200 MG/15ML
LIQUID ORAL AS NEEDED
Status: DISCONTINUED | OUTPATIENT
Start: 2024-06-13 | End: 2024-06-13 | Stop reason: HOSPADM

## 2024-06-13 RX ORDER — PHENYLEPHRINE HCL IN 0.9% NACL 1 MG/10 ML
SYRINGE (ML) INTRAVENOUS AS NEEDED
Status: DISCONTINUED | OUTPATIENT
Start: 2024-06-13 | End: 2024-06-13

## 2024-06-13 RX ORDER — ONDANSETRON 2 MG/ML
INJECTION INTRAMUSCULAR; INTRAVENOUS AS NEEDED
Status: DISCONTINUED | OUTPATIENT
Start: 2024-06-13 | End: 2024-06-13

## 2024-06-13 RX ORDER — OXYCODONE HYDROCHLORIDE AND ACETAMINOPHEN 5; 325 MG/1; MG/1
1 TABLET ORAL EVERY 4 HOURS PRN
Status: CANCELLED | OUTPATIENT
Start: 2024-06-13

## 2024-06-13 RX ORDER — SODIUM CHLORIDE 9 MG/ML
125 INJECTION, SOLUTION INTRAVENOUS CONTINUOUS
Status: DISCONTINUED | OUTPATIENT
Start: 2024-06-13 | End: 2024-06-13 | Stop reason: HOSPADM

## 2024-06-13 RX ORDER — LIDOCAINE HYDROCHLORIDE 20 MG/ML
INJECTION, SOLUTION EPIDURAL; INFILTRATION; INTRACAUDAL; PERINEURAL AS NEEDED
Status: DISCONTINUED | OUTPATIENT
Start: 2024-06-13 | End: 2024-06-13

## 2024-06-13 RX ORDER — ACETAMINOPHEN 10 MG/ML
1000 INJECTION, SOLUTION INTRAVENOUS ONCE
Status: COMPLETED | OUTPATIENT
Start: 2024-06-13 | End: 2024-06-13

## 2024-06-13 RX ORDER — OXYCODONE HYDROCHLORIDE 5 MG/1
5 TABLET ORAL EVERY 4 HOURS PRN
Qty: 16 TABLET | Refills: 0 | Status: SHIPPED | OUTPATIENT
Start: 2024-06-13 | End: 2024-06-23

## 2024-06-13 RX ORDER — HYDROMORPHONE HCL/PF 1 MG/ML
SYRINGE (ML) INJECTION AS NEEDED
Status: DISCONTINUED | OUTPATIENT
Start: 2024-06-13 | End: 2024-06-13

## 2024-06-13 RX ORDER — INDOCYANINE GREEN AND WATER 25 MG
7.5 KIT INJECTION ONCE
Status: COMPLETED | OUTPATIENT
Start: 2024-06-13 | End: 2024-06-13

## 2024-06-13 RX ORDER — BUPIVACAINE HYDROCHLORIDE AND EPINEPHRINE 2.5; 5 MG/ML; UG/ML
INJECTION, SOLUTION INFILTRATION; PERINEURAL AS NEEDED
Status: DISCONTINUED | OUTPATIENT
Start: 2024-06-13 | End: 2024-06-13 | Stop reason: HOSPADM

## 2024-06-13 RX ORDER — PROPOFOL 10 MG/ML
INJECTION, EMULSION INTRAVENOUS AS NEEDED
Status: DISCONTINUED | OUTPATIENT
Start: 2024-06-13 | End: 2024-06-13

## 2024-06-13 RX ORDER — KETOROLAC TROMETHAMINE 30 MG/ML
INJECTION, SOLUTION INTRAMUSCULAR; INTRAVENOUS AS NEEDED
Status: DISCONTINUED | OUTPATIENT
Start: 2024-06-13 | End: 2024-06-13

## 2024-06-13 RX ORDER — OXYCODONE HYDROCHLORIDE 10 MG/1
10 TABLET ORAL EVERY 4 HOURS PRN
Status: DISCONTINUED | OUTPATIENT
Start: 2024-06-13 | End: 2024-06-13 | Stop reason: HOSPADM

## 2024-06-13 RX ORDER — OXYCODONE HYDROCHLORIDE AND ACETAMINOPHEN 5; 325 MG/1; MG/1
2 TABLET ORAL EVERY 6 HOURS PRN
Status: CANCELLED | OUTPATIENT
Start: 2024-06-13

## 2024-06-13 RX ADMIN — SUGAMMADEX 200 MG: 100 INJECTION, SOLUTION INTRAVENOUS at 11:48

## 2024-06-13 RX ADMIN — PROPOFOL 50 MCG/KG/MIN: 10 INJECTION, EMULSION INTRAVENOUS at 09:28

## 2024-06-13 RX ADMIN — KETOROLAC TROMETHAMINE 30 MG: 30 INJECTION, SOLUTION INTRAMUSCULAR; INTRAVENOUS at 11:14

## 2024-06-13 RX ADMIN — FENTANYL CITRATE 25 MCG: 50 INJECTION INTRAMUSCULAR; INTRAVENOUS at 12:34

## 2024-06-13 RX ADMIN — SODIUM CHLORIDE, SODIUM LACTATE, POTASSIUM CHLORIDE, AND CALCIUM CHLORIDE: .6; .31; .03; .02 INJECTION, SOLUTION INTRAVENOUS at 10:10

## 2024-06-13 RX ADMIN — OXYCODONE 5 MG: 5 TABLET ORAL at 13:07

## 2024-06-13 RX ADMIN — ACETAMINOPHEN 1000 MG: 10 INJECTION INTRAVENOUS at 08:50

## 2024-06-13 RX ADMIN — HYDROMORPHONE HYDROCHLORIDE 0.5 MG: 0.5 INJECTION, SOLUTION INTRAMUSCULAR; INTRAVENOUS; SUBCUTANEOUS at 10:04

## 2024-06-13 RX ADMIN — ROCURONIUM BROMIDE 20 MG: 10 INJECTION, SOLUTION INTRAVENOUS at 10:34

## 2024-06-13 RX ADMIN — ROCURONIUM BROMIDE 20 MG: 10 INJECTION, SOLUTION INTRAVENOUS at 10:03

## 2024-06-13 RX ADMIN — ROCURONIUM BROMIDE 20 MG: 10 INJECTION, SOLUTION INTRAVENOUS at 11:12

## 2024-06-13 RX ADMIN — Medication 100 MCG: at 09:57

## 2024-06-13 RX ADMIN — DEXAMETHASONE SODIUM PHOSPHATE 10 MG: 10 INJECTION INTRAMUSCULAR; INTRAVENOUS at 09:35

## 2024-06-13 RX ADMIN — MIDAZOLAM 2 MG: 1 INJECTION INTRAMUSCULAR; INTRAVENOUS at 09:21

## 2024-06-13 RX ADMIN — FENTANYL CITRATE 50 MCG: 50 INJECTION INTRAMUSCULAR; INTRAVENOUS at 09:49

## 2024-06-13 RX ADMIN — ONDANSETRON 4 MG: 2 INJECTION INTRAMUSCULAR; INTRAVENOUS at 11:14

## 2024-06-13 RX ADMIN — PROPOFOL 200 MG: 10 INJECTION, EMULSION INTRAVENOUS at 09:28

## 2024-06-13 RX ADMIN — INDOCYANINE GREEN AND WATER 7.5 MG: KIT at 08:16

## 2024-06-13 RX ADMIN — LIDOCAINE HYDROCHLORIDE 100 MG: 20 INJECTION, SOLUTION EPIDURAL; INFILTRATION; INTRACAUDAL at 09:28

## 2024-06-13 RX ADMIN — ROCURONIUM BROMIDE 50 MG: 10 INJECTION, SOLUTION INTRAVENOUS at 09:28

## 2024-06-13 RX ADMIN — CEFAZOLIN SODIUM 2000 MG: 2 SOLUTION INTRAVENOUS at 09:34

## 2024-06-13 RX ADMIN — SODIUM CHLORIDE 125 ML/HR: 0.9 INJECTION, SOLUTION INTRAVENOUS at 08:17

## 2024-06-13 RX ADMIN — FENTANYL CITRATE 50 MCG: 50 INJECTION INTRAMUSCULAR; INTRAVENOUS at 09:28
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
## 2024-06-13 NOTE — OP NOTE
OPERATIVE REPORT  PATIENT NAME: Sheldon Harding    :  1980  MRN: [id number]  Pt Location: [facility] OR ROOM 08    SURGERY DATE: 2024    Surgeons and Role:     * Good Lam MD - Primary     * Karen Felipe PA-C - Assisting   CAMDEN Esteves MD - Assist  Preop Diagnosis:  Calculus of gallbladder without cholecystitis without obstruction [K80.20]    Post-Op Diagnosis Codes:     * Calculus of gallbladder without cholecystitis without obstruction [K80.20]    Procedure(s):  CHOLECYSTECTOMY  W/ ROBOTICS    Specimen(s):  ID Type Source Tests Collected by Time Destination   1 :  Tissue Gallbladder TISSUE EXAM Good Lam MD 2024 1125        Estimated Blood Loss:   Minimal    Drains:  * No LDAs found *    Anesthesia Type:   General    Operative Indications:  Calculus of gallbladder without cholecystitis without obstruction [K80.20]      Operative Findings:  Massively distended gallbladder with long cystic duct.  Extensive cholelithiasis      Complications:   None    Procedure and Technique:  The patient was seen again in the Holding Room. The risks, benefits, complications, treatment options, and expected outcomes were discussed with the patient. The possibilities of reaction to medication, pulmonary aspiration, perforation of viscus, bleeding, recurrent infection, finding a normal gallbladder, the need for additional procedures, failure to diagnose a condition, the possible need to convert to an open procedure, and creating a complication requiring transfusion or operation were discussed with the patient. The patient and/or family concurred with the proposed plan, giving informed consent. The site of surgery properly noted/marked. The patient was taken to Operating Room, identified as Sheldon Harding  and the procedure verified as Laparoscopic Cholecystectomy with possible Intraoperative Cholangiogram. A Time Out was held after prepping and draping in sterile fashion.  The above information was  confirmed.    Prior to the induction of general anesthesia, antibiotic prophylaxis was administered. General endotracheal anesthesia was then administered and tolerated well. After the induction, the abdomen was prepped in the usual sterile fashion. The patient was positioned in the supine position, along with some reverse Trendelenburg.    Local anesthetic agent was injected into the skin near the umbilicus and an incision made. The midline fascia was incised and the open technique was used to introduce a  port under direct vision.  Pneumoperitoneum was then created with CO2 and was tolerated well without any adverse changes in the patient's vital signs. Additional trocars were introduced under direct vision. All skin incisions were infiltrated with a local anesthetic agent before making the incision and placing the trocars.  The patient was placed in reverse Trendelenburg position. The two  8 mm robotic trocars were placed at least 10 cm from the camera port. A 5 mm system port was placed on the lateral right side.    The robot was docked.    The gallbladder was identified, the fundus grasped and retracted cephalad. Adhesions were lysed bluntly and with the electrocautery where indicated, taking care not to injure any adjacent organs or viscus. The infundibulum was grasped and retracted laterally, exposing the peritoneum overlying the triangle of Calot. This was then dissected anteriorily and posteriorly and exposed in a blunt fashion or using cautery where appropriate. The cystic duct was clearly identified and  dissected circumferentially, as was the cystic artery, as the only two tubular structures leading into the gallbladder .  The critical view of the [place] of Calot was identified.  The posterior aspect of the gallbladder was lifted off the cystic plate, to insure that there were no posterior structres behind the gallbladder.      Once this was all clearly identified, the cystic duct was then doubly  ligated with clips on the patient's side and 1 on the gallbladder side.  The cystic artery was then also clipped and transected.    The gallbladder was dissected from the liver bed in retrograde fashion with the electrocautery.  The robot was then undocked and a 5 mm camera was placed back in. The gallbladder was placed into an endocatch bag and secured.  The liver bed was irrigated and inspected. Hemostasis was achieved with the electrocautery. Copious irrigation was utilized and was repeatedly aspirated until clear.    The camera was then switched to the lateral port and directed back to the midline. The specimen was removed under direct visualization from the midline incision.  The fascia was then closed using the renfac suture passer and a figure of eight 0 vicryl suture.  Pneumoperitoneum was completely reduced after viewing removal of the trocars under direct vision.  The wound was thoroughly irrigated. The skin was then closed with 4-0 monocryl and histacryl.    Instrument, sponge, and needle counts were correct at closure and at the conclusion of the case.      PA is medically necessary for the surgical safety of the case, including suturing, retracting, and hemostasis.    I was present for the entire procedure.    Patient Disposition:  PACU         SIGNATURE: Good Lam MD  DATE: June 13, 2024  TIME: 11:42 AM

## 2024-06-13 NOTE — ANESTHESIA POSTPROCEDURE EVALUATION
Post-Op Assessment Note    CV Status:  Stable    Pain management: adequate       Mental Status:  Sleepy   Hydration Status:  Euvolemic   PONV Controlled:  Controlled   Airway Patency:  Patent     Post Op Vitals Reviewed: Yes    No anethesia notable event occurred.    Staff: CRNA               /66 (06/13/24 1157)    Temp 98.7 °F (37.1 °C) (06/13/24 1157)    Pulse 85 (06/13/24 1157)   Resp 20 (06/13/24 1157)    SpO2 98 % (06/13/24 1157)

## 2024-06-13 NOTE — ANESTHESIA PREPROCEDURE EVALUATION
Procedure:  CHOLECYSTECTOMY  W/ ROBOTICS (Abdomen)    Relevant Problems   ANESTHESIA (within normal limits)      CARDIO   (+) Benign essential hypertension   (+) Borderline hyperlipidemia   (+) Internal hemorrhoid, bleeding      GI/HEPATIC   (+) Internal hemorrhoid, bleeding   (+) Rectal bleeding      NEURO/PSYCH (within normal limits)      PULMONARY   (+) Asthma   (+) OFE (obstructive sleep apnea)      Orthopedic/Musculoskeletal   (+) Chronic pain of left knee      Other   (+) Obesity (BMI 30-39.9)   (+) Wheezing        Physical Exam    Airway    Mallampati score: II         Dental   No notable dental hx     Cardiovascular  Cardiovascular exam normal    Pulmonary  Pulmonary exam normal Breath sounds clear to auscultation    Other Findings        Anesthesia Plan  ASA Score- 2     Anesthesia Type- general with ASA Monitors.         Additional Monitors:     Airway Plan:            Plan Factors-    Chart reviewed.   Existing labs reviewed. Patient summary reviewed.    Patient is not a current smoker.              Induction- intravenous.    Postoperative Plan-         Informed Consent- Anesthetic plan and risks discussed with patient.

## 2024-06-13 NOTE — DISCHARGE INSTR - AVS FIRST PAGE
[facility]     Post-Operative Care Instructions     Dr. Good Lam MD, [facility]     [phone]          1. General: You will feel pulling sensations around the wound or funny aches and pains around the incisions. This is normal. Even minor surgery is a change in your body and this is your body’s way of reacting to it. If you have had abdominal surgery, it may help to support the incision with a small pillow or blanket for comfort when moving or coughing.     2. Wound care:  Okay to shower.  The glue will fall off over the next week or 2.   Use ice for at least the 1st 48 hours.  Do not use for longer than 20 minutes at a time. Use 3 times per day.     3. Water: You may shower over the wounds. Do not bathe or use a pool or hot tub until cleared by the physician.   If you were discharged with a drain, make sure drain site is covered with plastic wrap before showering.      4. Activity: You may go up and down stairs, walk as much as you are comfortable, but walk at least 3 times each day. If you have had abdominal surgery, do not lift anything heavier than 20 pounds for at least 4 weeks.      5. Diet: You may resume a regular diet. If you had a same-day surgery or overnight stay surgery, you may wish to eat lightly for a few days: soups, crackers, and sandwiches. You may resume a regular diet when ready.      6. Medications: Resume all of your previous medications, unless told otherwise by the doctor. Avoid aspirin products for 2-3 days after the date of surgery. You may, at that time, began to take them again. Use Tylenol and Ibuprofen for pain control.  You may alternate these medications every 3 hours.  For example: you may take Tylenol at noon, Ibuprofen at 3:00 p.m., and Tylenol again at 6:00 p.m., etc. You should use ice to assist with pain control as above.  You do not need to take narcotic pain medication unless you are having significant pain.   If you were prescribed a  narcotic pain medication containing Tylenol, such as Percocet or Norco, do not use supplemental Tylenol.      7. Driving: You will need someone to drive you home on the day of surgery or discharge. Do not drive or make any important decisions while on narcotic pain medication or 24 hours and after anesthesia or sedation for surgery. Generally, you may drive when your off all narcotic pain medications and you are comfortable.      8. Upset Stomach: You may take Maalox, Tums, or similar items for an upset stomach. If your narcotic pain medication causes an upset stomach, do not take it on an empty stomach. Try taking it with at least some crackers or toast.      9. Constipation: Patients often experience constipation after surgery. You may take over-the-counter medication for this, such as Metamucil, Senokot, Dulcolax, milk of magnesia, etc. You may take a suppository unless you have had anorectal surgery such as a procedure on your hemorrhoids. If you experience significant nausea or vomiting after abdominal surgery, call the office before trying any of these medications.     10. Call the office: If you are experiencing any of the following: fevers above 101.5°, significant nausea or vomiting, if the wound develops drainage and/or there is excessive redness around the wound, or if you have significant diarrhea or other worsening symptoms.     11. Pain: You may be given a prescription for pain medication.  This will be sent to your pharmacy prior to discharge.     12. Sexual Activity: You may resume sexual activity when you feel ready and comfortable and your incision is sealed and healed without apparent infection risk.     13. Urination: If you have not urinated in 6 hours, go directly to the ER for evaluation for urinary retention.      14. Follow-up in 2 weeks.          **READ ONLY IF YOU HAVE BEEN DISCHARGED WITH A URINARY CATHETER**    Celestin Insertion for Post-Op Urinary Retention        - A prescription for  Flomax will be sent to your pharmacy.  This should be taken daily while the urinary catheter remains in place.    You will not be given a prescription for Flomax if your prostate has been removed.  If you are already taking Flomax, continue the medication as prescribed.     - We will send a message to the urology group who will contact you within the next 48 hours with further instructions and to schedule an appointment for voiding trial and catheter removal.  The urinary catheter will remain in place for approximately 1 week.  If you are not contacted within the next 48 hours please call our office to assist with scheduling your follow-up.     - If you have your own urologist, you should contact your physician the day after discharge for instructions and to schedule a voiding trial and catheter removal.

## 2024-06-17 PROCEDURE — 88304 TISSUE EXAM BY PATHOLOGIST: CPT | Performed by: PATHOLOGY

## 2024-06-25 ENCOUNTER — ANESTHESIA (OUTPATIENT)
Dept: GASTROENTEROLOGY | Facility: HOSPITAL | Age: 44
End: 2024-06-25

## 2024-06-25 ENCOUNTER — HOSPITAL ENCOUNTER (OUTPATIENT)
Dept: GASTROENTEROLOGY | Facility: HOSPITAL | Age: 44
Setting detail: OUTPATIENT SURGERY
Discharge: HOME | End: 2024-06-25
Attending: SURGERY
Payer: COMMERCIAL

## 2024-06-25 ENCOUNTER — ANESTHESIA EVENT (OUTPATIENT)
Dept: GASTROENTEROLOGY | Facility: HOSPITAL | Age: 44
End: 2024-06-25

## 2024-06-25 VITALS
DIASTOLIC BLOOD PRESSURE: 69 MMHG | OXYGEN SATURATION: 97 % | RESPIRATION RATE: 16 BRPM | TEMPERATURE: 97.9 F | SYSTOLIC BLOOD PRESSURE: 104 MMHG | HEART RATE: 75 BPM

## 2024-06-25 DIAGNOSIS — K92.1 HEMATOCHEZIA: ICD-10-CM

## 2024-06-25 PROCEDURE — 88305 TISSUE EXAM BY PATHOLOGIST: CPT | Performed by: PATHOLOGY

## 2024-06-25 PROCEDURE — 45385 COLONOSCOPY W/LESION REMOVAL: CPT | Performed by: SURGERY

## 2024-06-25 RX ORDER — SODIUM CHLORIDE 9 MG/ML
INJECTION, SOLUTION INTRAVENOUS CONTINUOUS PRN
Status: DISCONTINUED | OUTPATIENT
Start: 2024-06-25 | End: 2024-06-25

## 2024-06-25 RX ORDER — SODIUM CHLORIDE 9 MG/ML
125 INJECTION, SOLUTION INTRAVENOUS CONTINUOUS
Status: DISCONTINUED | OUTPATIENT
Start: 2024-06-25 | End: 2024-06-29 | Stop reason: HOSPADM

## 2024-06-25 RX ORDER — PROPOFOL 10 MG/ML
INJECTION, EMULSION INTRAVENOUS AS NEEDED
Status: DISCONTINUED | OUTPATIENT
Start: 2024-06-25 | End: 2024-06-25

## 2024-06-25 RX ORDER — PROPOFOL 10 MG/ML
INJECTION, EMULSION INTRAVENOUS CONTINUOUS PRN
Status: DISCONTINUED | OUTPATIENT
Start: 2024-06-25 | End: 2024-06-25

## 2024-06-25 RX ADMIN — SODIUM CHLORIDE: 9 INJECTION, SOLUTION INTRAVENOUS at 08:35

## 2024-06-25 RX ADMIN — SODIUM CHLORIDE 125 ML/HR: 0.9 INJECTION, SOLUTION INTRAVENOUS at 08:20

## 2024-06-25 RX ADMIN — PROPOFOL 80 MG: 10 INJECTION, EMULSION INTRAVENOUS at 08:38

## 2024-06-25 RX ADMIN — PROPOFOL 130 MCG/KG/MIN: 10 INJECTION, EMULSION INTRAVENOUS at 08:40

## 2024-06-25 RX ADMIN — Medication 40 MG: at 08:47

## 2024-06-25 RX ADMIN — PROPOFOL 50 MG: 10 INJECTION, EMULSION INTRAVENOUS at 08:39

## 2024-06-25 NOTE — ANESTHESIA PREPROCEDURE EVALUATION
Procedure:  COLONOSCOPY    Relevant Problems   CARDIO   (+) Benign essential hypertension   (+) Borderline hyperlipidemia   (+) Internal hemorrhoid, bleeding      GI/HEPATIC   (+) Internal hemorrhoid, bleeding   (+) Rectal bleeding      PULMONARY   (+) Asthma   (+) OFE (obstructive sleep apnea)        Physical Exam    Airway    Mallampati score: II         Dental       Cardiovascular  Rhythm: regular, Rate: normal    Pulmonary   Breath sounds clear to auscultation    Other Findings        Anesthesia Plan  ASA Score- 2     Anesthesia Type-     Plan Factors-Exercise tolerance (METS): >4 METS.    Chart reviewed.   Existing labs reviewed. Patient summary reviewed.    Patient is not a current smoker.      Obstructive sleep apnea risk education given perioperatively.        Induction- intravenous.    Postoperative Plan-     Perioperative Resuscitation Plan - Level 1 - Full Code.       Informed Consent- Anesthetic plan and risks discussed with patient.
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
## 2024-06-25 NOTE — DISCHARGE INSTR - AVS FIRST PAGE
[facility]  Endoscopy Post-Operative Instructions  Dr. Alexandrea Steen MD, FACS    Procedure: Colonoscopy    Findings:  Diverticulosis and Colon Polyp(s)    Follow-Up: You will need a repeat Endoscopy in (generally) in 5 years    Will await final pathology report for final determination of number of years until your follow up endoscopy, if you had polyps on this exam.  Different types of polyps require different lengths of follow up surveillance. Please call our office or your primary doctor's office if you have any questions, once the report is returned.        You should have an endoscopy sooner than recommended if you have any symptoms of bleeding or change in stools or other concerns.      You will receive a call from our office with your results, in addition to the the preliminary results you received today.      You will usually receive a follow-up letter from our office in 1-2 weeks.  Call the office if you do not hear from us. You are welcome to also schedule an office visit if desired to discuss the results further.     It is your responsibility to contact our office for results in 1- 2 weeks if you do not hear from us.    If a follow up endoscopy is needed, you are responsible for arranging that follow up appointment at the appropriate time.  The office may or may not issue a reminder at that future time.  Please take responsibility for your own follow up healthcare.      Diet: Eat a light snack first, and then resume your previous diet.    Call the office if you have unusual fevers, chills, nausea or vomiting or abdominal pain.  Report to the emergency room with these are severe in nature.      Activity: Do not drive a car, operate machinery, or sign legal documents for 24 hours after your procedure. Normal activity may be resumed on the day following the procedure.     Call the office at [phone] for any of the following: Severe abdominal pain, significant rectal bleeding, chills,  or fever above 100°, new onset of persistent cough or persistent vomiting.     [facility]  [address], [address]  [place] [address]  Phone: [phone]

## 2024-06-25 NOTE — INTERVAL H&P NOTE
H&P reviewed. After examining the patient I find no changes in the patients condition since the H&P had been written.    Vitals:    06/25/24 0809   BP: 113/73   Pulse: 76   Resp: 18   Temp: 98.6 °F (37 °C)   SpO2: 95%

## 2024-06-25 NOTE — ANESTHESIA POSTPROCEDURE EVALUATION
Post-Op Assessment Note    CV Status:  Stable  Pain Score: 0    Pain management: adequate    Multimodal analgesia used between 6 hours prior to anesthesia start to PACU discharge    Mental Status:  Sleepy and arousable   Hydration Status:  Stable   PONV Controlled:  None   Airway Patency:  Patent  There is a medical reason for not screening for obstructive sleep apnea and/or for not using two or more mitigation strategies   Post Op Vitals Reviewed: Yes    No anethesia notable event occurred.    Staff: CRNA               /73 (06/25/24 0857)    Temp 97.9 °F (36.6 °C) (06/25/24 0857)    Pulse 85 (06/25/24 0857)   Resp 16 (06/25/24 0857)    SpO2 96 % (06/25/24 0857)

## 2024-06-26 ENCOUNTER — OFFICE VISIT (OUTPATIENT)
Dept: SURGERY | Facility: CLINIC | Age: 44
End: 2024-06-26

## 2024-06-26 VITALS
DIASTOLIC BLOOD PRESSURE: 86 MMHG | BODY MASS INDEX: 34.75 KG/M2 | HEART RATE: 79 BPM | SYSTOLIC BLOOD PRESSURE: 130 MMHG | HEIGHT: 67 IN | WEIGHT: 221.4 LBS

## 2024-06-26 DIAGNOSIS — Z09 POSTOP CHECK: Primary | ICD-10-CM

## 2024-06-26 PROCEDURE — 99024 POSTOP FOLLOW-UP VISIT: CPT | Performed by: SURGERY

## 2024-06-26 NOTE — PROGRESS NOTES
"GENERAL SURGERY POST-OP NOTE  Sheldon Harding   MRN: [id number]   : 1980  Chief Complaint   Patient presents with    Post-op     POST-OP//HAKEEM LAP HOWARD     PT is here for a p/o, has intermittent pain but nothing concerning, habits are normal, the glue is peeling as well.       Assessment & Plan   There are no diagnoses linked to this encounter.  Patient is doing well status post-operative laparoscopic cholecystectomy.  Post-operative care restrictions discussed. May return to work when ready.    he is progressing nicely.  I will see him back on an as needed basis.  Pathology reviewed consistent with chronic cholecystitis with cholelithiasis.  Subjective   The patient is a 44 y.o. male who presents for routine post-operative follow up status postcholecystectomy.  He denies any present complaints. Activity level has been appropriate. He is tolerating a regular diet. Pain is well-controlled with current therapy.    The following portions of the patient's history were reviewed by a provider in this encounter and updated as appropriate:             Objective   Physical Exam:  /86 (BP Location: Left arm, Patient Position: Sitting, Cuff Size: Large)   Pulse 79   Ht 5' 7\" (1.702 m)   Wt 100 kg (221 lb 6.4 oz)   BMI 34.68 kg/m²   Constitutional: not in acute distress, well developed and well nourished  Abdomen: soft, bowel sounds active, non-tender, no abnormal masses  Incision: healing well, no drainage, no erythema, well approximated  Tenderness at incision site: mild    Current Outpatient Medications   Medication Sig Dispense Refill    albuterol (PROVENTIL HFA,VENTOLIN HFA) 90 mcg/act inhaler Inhale 2 puffs every 6 (six) hours as needed for wheezing or shortness of breath 18 g 0    albuterol (Ventolin HFA) 90 mcg/act inhaler Inhale 2 puffs every 4 (four) hours as needed for wheezing 18 g 0    Multiple Vitamin (MULTIVITAMIN) capsule Take 1 capsule by mouth daily      oxybutynin (DITROPAN-XL) " 5 mg 24 hr tablet Take 1 tablet (5 mg total) by mouth daily 90 tablet 1    ALPRAZolam (XANAX) 1 mg tablet Take 1 tablet (1 mg total) by mouth 1 (one) time for 1 dose Take one hour prior to procedure, must have  to and from (Patient not taking: Reported on 4/23/2024) 1 tablet 0    benzonatate (TESSALON) 200 MG capsule Take 1 capsule (200 mg total) by mouth 3 (three) times a day as needed for cough (Patient not taking: Reported on 6/26/2024) 20 capsule 0    clobetasol (TEMOVATE) 0.05 % cream Apply topically 2 (two) times a day (Patient not taking: Reported on 6/4/2024) 60 g 1    EPINEPHrine (EPIPEN) 0.3 mg/0.3 mL SOAJ Inject 0.3 mL (0.3 mg total) into a muscle once for 1 dose 0.6 mL 0    methylPREDNISolone 4 MG tablet therapy pack Use as directed on package (Patient not taking: Reported on 6/4/2024) 21 tablet 0    Mirabegron ER (Myrbetriq) 25 MG TB24 Take 25 mg by mouth daily at bedtime as needed (prostate symptoms) (Patient not taking: Reported on 6/4/2024) 30 tablet 1     No current facility-administered medications for this visit.     Facility-Administered Medications Ordered in Other Visits   Medication Dose Route Frequency Provider Last Rate Last Admin    sodium chloride 0.9 % infusion  125 mL/hr Intravenous Continuous Randal Qiu DO   Stopped at 06/25/24 0919     Patient has no known allergies.   Past Medical History:   Diagnosis Date    Asthma 6/13/2024    Hypertension     Last assessed 2/21/2017        Good Lam MD

## 2024-06-27 ENCOUNTER — TELEPHONE (OUTPATIENT)
Dept: SURGERY | Facility: CLINIC | Age: 44
End: 2024-06-27

## 2024-06-27 NOTE — TELEPHONE ENCOUNTER
Post op call:    Spoke with patient states he is feeling fine polyps were found he will be due in 5 years and we will reach back out with pathology results once we receive them and the doctor reviews them .

## 2024-06-28 PROCEDURE — 88305 TISSUE EXAM BY PATHOLOGIST: CPT | Performed by: PATHOLOGY

## 2024-06-28 NOTE — RESULT ENCOUNTER NOTE
Please call patient with results.  Patient has at least 1 or more tubular adenomas on this path report.  These are benign lesions but may have some predisposition to growth and development into malignancy.  In general, 3, 5 or 7 year follow-up recommended.  Please see my notes for details.

## 2024-07-02 ENCOUNTER — TELEPHONE (OUTPATIENT)
Dept: SURGERY | Facility: CLINIC | Age: 44
End: 2024-07-02

## 2024-07-02 NOTE — TELEPHONE ENCOUNTER
----- Message from Alexandrea Steen MD sent at 6/28/2024 11:45 AM EDT -----  Please call patient with results.  Patient has at least 1 or more tubular adenomas on this path report.  These are benign lesions but may have some predisposition to growth and development into malignancy.  In general, 3, 5 or 7 year follow-up recommended.  Please see my notes for details.

## 2024-09-25 ENCOUNTER — APPOINTMENT (OUTPATIENT)
Dept: RADIOLOGY | Facility: MEDICAL CENTER | Age: 44
End: 2024-09-25
Payer: COMMERCIAL

## 2024-09-25 ENCOUNTER — OFFICE VISIT (OUTPATIENT)
Age: 44
End: 2024-09-25
Payer: COMMERCIAL

## 2024-09-25 VITALS
HEART RATE: 80 BPM | OXYGEN SATURATION: 98 % | TEMPERATURE: 98 F | WEIGHT: 224 LBS | SYSTOLIC BLOOD PRESSURE: 128 MMHG | HEIGHT: 67 IN | BODY MASS INDEX: 35.16 KG/M2 | DIASTOLIC BLOOD PRESSURE: 80 MMHG

## 2024-09-25 DIAGNOSIS — S67.21XD CRUSHING INJURY OF RIGHT HAND, SUBSEQUENT ENCOUNTER: ICD-10-CM

## 2024-09-25 DIAGNOSIS — S67.21XD CRUSHING INJURY OF RIGHT HAND, SUBSEQUENT ENCOUNTER: Primary | ICD-10-CM
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
## 2024-09-25 PROCEDURE — 99214 OFFICE O/P EST MOD 30 MIN: CPT | Performed by: FAMILY MEDICINE

## 2024-09-25 PROCEDURE — 73130 X-RAY EXAM OF HAND: CPT

## 2024-09-25 RX ORDER — PERMETHRIN 50 MG/G
CREAM TOPICAL ONCE
Qty: 60 G | Refills: 0 | Status: SHIPPED | OUTPATIENT
Start: 2024-09-25 | End: 2024-09-25

## 2024-09-27 ENCOUNTER — TELEPHONE (OUTPATIENT)
Age: 44
End: 2024-09-27

## 2024-09-27 DIAGNOSIS — S62.309D CLOSED FRACTURE OF METACARPAL BONE WITH ROUTINE HEALING, UNSPECIFIED FRACTURE MORPHOLOGY, UNSPECIFIED METACARPAL, UNSPECIFIED PORTION OF METACARPAL, SUBSEQUENT ENCOUNTER: Primary | ICD-10-CM

## 2024-09-27 NOTE — TELEPHONE ENCOUNTER
Patient's XR results are in and he would like a call back with his PCP recommendations. His EC called but is going to work.

## 2024-09-27 NOTE — PROGRESS NOTES
"Assessment/Plan:    44 y/p male with: right hand injury and bug bites. Will restart permethrin. Will check xray. Discussed supportive care and return parameters.     No problem-specific Assessment & Plan notes found for this encounter.       Diagnoses and all orders for this visit:    Crushing injury of right hand, subsequent encounter  -     XR hand 3+ vw right; Future    Bug bite, subsequent encounter  -     permethrin (ELIMITE) 5 % cream; Apply topically once for 1 dose          Subjective:     Chief Complaint   Patient presents with    Well Check     Physical due      swollen finger     Right hand ring finger swollen for 2 weeks, no further concerns, ng        Patient ID: Sheldon Harding is a 44 y.o. male.    Patient is a 45 y/o male who presents for follow-up on right hand injury and recurrence of bug bites that were previously treated like scabies no fevers chills nausea or vomiting.        The following portions of the patient's history were reviewed and updated as appropriate: allergies, current medications, past family history, past medical history, past social history, past surgical history and problem list.    Review of Systems   Constitutional: Negative.    HENT: Negative.     Eyes: Negative.    Respiratory: Negative.     Cardiovascular: Negative.    Gastrointestinal: Negative.    Endocrine: Negative.    Genitourinary: Negative.    Musculoskeletal:  Positive for arthralgias.   Skin:  Positive for rash.   Allergic/Immunologic: Negative.    Neurological: Negative.    Hematological: Negative.    Psychiatric/Behavioral: Negative.     All other systems reviewed and are negative.        Objective:      /80 (BP Location: Left arm, Patient Position: Sitting, Cuff Size: Large)   Pulse 80   Temp 98 °F (36.7 °C) (Temporal)   Ht 5' 7\" (1.702 m)   Wt 102 kg (224 lb)   SpO2 98%   BMI 35.08 kg/m²          Physical Exam  Constitutional:       Appearance: He is well-developed.   HENT:      Head: Atraumatic. "      Right Ear: External ear normal.      Left Ear: External ear normal.   Eyes:      Extraocular Movements: EOM normal.      Conjunctiva/sclera: Conjunctivae normal.      Pupils: Pupils are equal, round, and reactive to light.   Cardiovascular:      Rate and Rhythm: Normal rate and regular rhythm.      Heart sounds: Normal heart sounds.   Pulmonary:      Effort: Pulmonary effort is normal. No respiratory distress.      Breath sounds: Normal breath sounds.   Abdominal:      General: There is no distension.      Palpations: Abdomen is soft.      Tenderness: There is no abdominal tenderness. There is no guarding or rebound.   Musculoskeletal:         General: Tenderness present. Normal range of motion.      Cervical back: Normal range of motion.   Skin:     General: Skin is warm and dry.   Neurological:      Mental Status: He is alert and oriented to person, place, and time.      Cranial Nerves: No cranial nerve deficit.   Psychiatric:         Mood and Affect: Mood and affect normal.         Behavior: Behavior normal.         Thought Content: Thought content normal.         Judgment: Judgment normal.

## 2024-09-30 ENCOUNTER — OFFICE VISIT (OUTPATIENT)
Dept: OBGYN CLINIC | Facility: CLINIC | Age: 44
End: 2024-09-30
Payer: COMMERCIAL

## 2024-09-30 VITALS
BODY MASS INDEX: 35.16 KG/M2 | HEIGHT: 67 IN | WEIGHT: 224 LBS | SYSTOLIC BLOOD PRESSURE: 121 MMHG | DIASTOLIC BLOOD PRESSURE: 70 MMHG

## 2024-09-30 DIAGNOSIS — S62.309D CLOSED FRACTURE OF METACARPAL BONE WITH ROUTINE HEALING, UNSPECIFIED FRACTURE MORPHOLOGY, UNSPECIFIED METACARPAL, UNSPECIFIED PORTION OF METACARPAL, SUBSEQUENT ENCOUNTER: ICD-10-CM

## 2024-09-30 PROCEDURE — 99203 OFFICE O/P NEW LOW 30 MIN: CPT | Performed by: SURGERY
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
## 2024-10-04 ENCOUNTER — PROCEDURE VISIT (OUTPATIENT)
Dept: UROLOGY | Facility: MEDICAL CENTER | Age: 44
End: 2024-10-04
Payer: COMMERCIAL

## 2024-10-04 VITALS
HEART RATE: 67 BPM | HEIGHT: 67 IN | DIASTOLIC BLOOD PRESSURE: 92 MMHG | SYSTOLIC BLOOD PRESSURE: 124 MMHG | BODY MASS INDEX: 34.84 KG/M2 | OXYGEN SATURATION: 98 % | WEIGHT: 222 LBS

## 2024-10-04 DIAGNOSIS — Z30.2 ENCOUNTER FOR VASECTOMY: Primary | ICD-10-CM

## 2024-10-04 PROCEDURE — 55250 REMOVAL OF SPERM DUCT(S): CPT | Performed by: UROLOGY

## 2024-10-04 PROCEDURE — 88302 TISSUE EXAM BY PATHOLOGIST: CPT | Performed by: SPECIALIST

## 2024-10-09 PROCEDURE — 88302 TISSUE EXAM BY PATHOLOGIST: CPT | Performed by: SPECIALIST

## 2024-12-05 ENCOUNTER — TELEPHONE (OUTPATIENT)
Age: 44
End: 2024-12-05

## 2025-03-11 DIAGNOSIS — N32.81 OAB (OVERACTIVE BLADDER): ICD-10-CM

## 2025-03-11 RX ORDER — OXYBUTYNIN CHLORIDE 5 MG/1
5 TABLET, EXTENDED RELEASE ORAL DAILY
Qty: 90 TABLET | Refills: 0 | Status: SHIPPED | OUTPATIENT
Start: 2025-03-11

## 2025-03-21 ENCOUNTER — OFFICE VISIT (OUTPATIENT)
Age: 45
End: 2025-03-21
Payer: COMMERCIAL

## 2025-03-21 VITALS
OXYGEN SATURATION: 98 % | BODY MASS INDEX: 35.41 KG/M2 | HEART RATE: 71 BPM | TEMPERATURE: 98.2 F | DIASTOLIC BLOOD PRESSURE: 88 MMHG | SYSTOLIC BLOOD PRESSURE: 112 MMHG | WEIGHT: 225.6 LBS | HEIGHT: 67 IN

## 2025-03-21 DIAGNOSIS — Z00.00 ROUTINE ADULT HEALTH MAINTENANCE: Primary | ICD-10-CM

## 2025-03-21 PROCEDURE — 99396 PREV VISIT EST AGE 40-64: CPT | Performed by: FAMILY MEDICINE

## 2025-03-25 NOTE — PROGRESS NOTES
"Name: Sheldon Harding      : 1980      MRN: [id number]  Encounter Provider: Juan Arzola MD  Encounter Date: 3/21/2025   Encounter department: [facility] PRIMARY CARE  :  Assessment & Plan  Routine adult health maintenance  Annual well visit. Discussed various safety and health maintenance issues including healthy diet like the Mediterranean diet, exercise, ample sleep, stress reduction, and healthy weight as tolerated. Discussed supportive care and return parameters.   Orders:    CBC and differential; Future    Comprehensive metabolic panel; Future    TSH, 3rd generation with Free T4 reflex; Future    Lipid Panel with Direct LDL reflex; Future    Hemoglobin A1C; Future           History of Present Illness   Patient is a 45 y/o male who presents for annual well visit admits being active eats and sleeps well. No acute complaints.      Review of Systems   Constitutional: Negative.    HENT: Negative.     Eyes: Negative.    Respiratory: Negative.     Cardiovascular: Negative.    Gastrointestinal: Negative.    Endocrine: Negative.    Genitourinary: Negative.    Musculoskeletal: Negative.    Allergic/Immunologic: Negative.    Neurological: Negative.    Hematological: Negative.    Psychiatric/Behavioral: Negative.     All other systems reviewed and are negative.      Objective   /88 (BP Location: Left arm, Patient Position: Sitting, Cuff Size: Large)   Pulse 71   Temp 98.2 °F (36.8 °C) (Temporal)   Ht 5' 7\" (1.702 m)   Wt 102 kg (225 lb 9.6 oz)   SpO2 98%   BMI 35.33 kg/m²      Physical Exam  Vitals reviewed.   Constitutional:       General: He is not in acute distress.     Appearance: He is well-developed. He is not diaphoretic.   HENT:      Head: Normocephalic and atraumatic.      Right Ear: External ear normal.      Left Ear: External ear normal.      Nose: Nose normal.   Eyes:      General: No scleral icterus.        Right eye: No discharge.         Left eye: No discharge.      " Conjunctiva/sclera: Conjunctivae normal.      Pupils: Pupils are equal, round, and reactive to light.   Neck:      Thyroid: No thyromegaly.      Trachea: No tracheal deviation.   Cardiovascular:      Rate and Rhythm: Normal rate and regular rhythm.      Heart sounds: Normal heart sounds. No murmur heard.     No friction rub.   Pulmonary:      Effort: Pulmonary effort is normal. No respiratory distress.      Breath sounds: Normal breath sounds. No stridor. No wheezing or rales.   Abdominal:      General: There is no distension.      Palpations: Abdomen is soft. There is no mass.      Tenderness: There is no abdominal tenderness. There is no guarding or rebound.   Musculoskeletal:         General: Normal range of motion.      Cervical back: Normal range of motion and neck supple.   Lymphadenopathy:      Cervical: No cervical adenopathy.   Skin:     General: Skin is warm.   Neurological:      Mental Status: He is alert and oriented to person, place, and time.      Cranial Nerves: No cranial nerve deficit.   Psychiatric:         Behavior: Behavior normal.         Thought Content: Thought content normal.         Judgment: Judgment normal.

## 2025-03-27 ENCOUNTER — RESULTS FOLLOW-UP (OUTPATIENT)
Age: 45
End: 2025-03-27

## 2025-03-27 ENCOUNTER — APPOINTMENT (OUTPATIENT)
Age: 45
End: 2025-03-27
Payer: COMMERCIAL

## 2025-03-27 DIAGNOSIS — Z00.00 ROUTINE ADULT HEALTH MAINTENANCE: ICD-10-CM

## 2025-03-27 LAB
ALBUMIN SERPL BCG-MCNC: 4.6 G/DL (ref 3.5–5)
ALP SERPL-CCNC: 56 U/L (ref 34–104)
ALT SERPL W P-5'-P-CCNC: 46 U/L (ref 7–52)
ANION GAP SERPL CALCULATED.3IONS-SCNC: 9 MMOL/L (ref 4–13)
AST SERPL W P-5'-P-CCNC: 27 U/L (ref 13–39)
BASOPHILS # BLD AUTO: 0.04 THOUSANDS/ÂΜL (ref 0–0.1)
BASOPHILS NFR BLD AUTO: 1 % (ref 0–1)
BILIRUB SERPL-MCNC: 0.62 MG/DL (ref 0.2–1)
BUN SERPL-MCNC: 13 MG/DL (ref 5–25)
CALCIUM SERPL-MCNC: 9.4 MG/DL (ref 8.4–10.2)
CHLORIDE SERPL-SCNC: 106 MMOL/L (ref 96–108)
CHOLEST SERPL-MCNC: 205 MG/DL (ref ?–200)
CO2 SERPL-SCNC: 27 MMOL/L (ref 21–32)
CREAT SERPL-MCNC: 0.75 MG/DL (ref 0.6–1.3)
EOSINOPHIL # BLD AUTO: 0.14 THOUSAND/ÂΜL (ref 0–0.61)
EOSINOPHIL NFR BLD AUTO: 3 % (ref 0–6)
ERYTHROCYTE [DISTWIDTH] IN BLOOD BY AUTOMATED COUNT: 13.4 % (ref 11.6–15.1)
EST. AVERAGE GLUCOSE BLD GHB EST-MCNC: 114 MG/DL
GFR SERPL CREATININE-BSD FRML MDRD: 111 ML/MIN/1.73SQ M
GLUCOSE P FAST SERPL-MCNC: 96 MG/DL (ref 65–99)
HBA1C MFR BLD: 5.6 %
HCT VFR BLD AUTO: 44.8 % (ref 36.5–49.3)
HDLC SERPL-MCNC: 50 MG/DL
HGB BLD-MCNC: 15.1 G/DL (ref 12–17)
IMM GRANULOCYTES # BLD AUTO: 0.02 THOUSAND/UL (ref 0–0.2)
IMM GRANULOCYTES NFR BLD AUTO: 0 % (ref 0–2)
LDLC SERPL CALC-MCNC: 138 MG/DL (ref 0–100)
LYMPHOCYTES # BLD AUTO: 2.48 THOUSANDS/ÂΜL (ref 0.6–4.47)
LYMPHOCYTES NFR BLD AUTO: 45 % (ref 14–44)
MCH RBC QN AUTO: 30.6 PG (ref 26.8–34.3)
MCHC RBC AUTO-ENTMCNC: 33.7 G/DL (ref 31.4–37.4)
MCV RBC AUTO: 91 FL (ref 82–98)
MONOCYTES # BLD AUTO: 0.52 THOUSAND/ÂΜL (ref 0.17–1.22)
MONOCYTES NFR BLD AUTO: 10 % (ref 4–12)
NEUTROPHILS # BLD AUTO: 2.19 THOUSANDS/ÂΜL (ref 1.85–7.62)
NEUTS SEG NFR BLD AUTO: 41 % (ref 43–75)
NRBC BLD AUTO-RTO: 0 /100 WBCS
PLATELET # BLD AUTO: 260 THOUSANDS/UL (ref 149–390)
PMV BLD AUTO: 10.5 FL (ref 8.9–12.7)
POTASSIUM SERPL-SCNC: 4.1 MMOL/L (ref 3.5–5.3)
PROT SERPL-MCNC: 7.5 G/DL (ref 6.4–8.4)
RBC # BLD AUTO: 4.93 MILLION/UL (ref 3.88–5.62)
SODIUM SERPL-SCNC: 142 MMOL/L (ref 135–147)
TRIGL SERPL-MCNC: 86 MG/DL (ref ?–150)
TSH SERPL DL<=0.05 MIU/L-ACNC: 1.4 UIU/ML (ref 0.45–4.5)
WBC # BLD AUTO: 5.39 THOUSAND/UL (ref 4.31–10.16)

## 2025-03-27 PROCEDURE — 36415 COLL VENOUS BLD VENIPUNCTURE: CPT

## 2025-03-27 PROCEDURE — 80061 LIPID PANEL: CPT

## 2025-03-27 PROCEDURE — 83036 HEMOGLOBIN GLYCOSYLATED A1C: CPT

## 2025-03-27 PROCEDURE — 84443 ASSAY THYROID STIM HORMONE: CPT

## 2025-03-27 PROCEDURE — 85025 COMPLETE CBC W/AUTO DIFF WBC: CPT

## 2025-03-27 PROCEDURE — 80053 COMPREHEN METABOLIC PANEL: CPT

## 2025-04-09 ENCOUNTER — OFFICE VISIT (OUTPATIENT)
Age: 45
End: 2025-04-09
Payer: COMMERCIAL

## 2025-04-09 VITALS
HEIGHT: 67 IN | SYSTOLIC BLOOD PRESSURE: 120 MMHG | RESPIRATION RATE: 16 BRPM | TEMPERATURE: 101.6 F | OXYGEN SATURATION: 98 % | BODY MASS INDEX: 34.84 KG/M2 | WEIGHT: 222 LBS | HEART RATE: 129 BPM | DIASTOLIC BLOOD PRESSURE: 80 MMHG

## 2025-04-09 DIAGNOSIS — J06.9 ACUTE UPPER RESPIRATORY INFECTION: Primary | ICD-10-CM

## 2025-04-09 DIAGNOSIS — N32.81 OAB (OVERACTIVE BLADDER): ICD-10-CM

## 2025-04-09 DIAGNOSIS — J45.909 UNCOMPLICATED ASTHMA, UNSPECIFIED ASTHMA SEVERITY, UNSPECIFIED WHETHER PERSISTENT: ICD-10-CM

## 2025-04-09 PROCEDURE — 87636 SARSCOV2 & INF A&B AMP PRB: CPT | Performed by: FAMILY MEDICINE

## 2025-04-09 PROCEDURE — 99214 OFFICE O/P EST MOD 30 MIN: CPT | Performed by: FAMILY MEDICINE

## 2025-04-09 RX ORDER — AMOXICILLIN 500 MG/1
500 TABLET, FILM COATED ORAL 3 TIMES DAILY
Qty: 21 TABLET | Refills: 0 | Status: SHIPPED | OUTPATIENT
Start: 2025-04-09 | End: 2025-04-16

## 2025-04-09 RX ORDER — OXYBUTYNIN CHLORIDE 5 MG/1
5 TABLET ORAL 3 TIMES DAILY
Qty: 90 TABLET | Refills: 1 | Status: SHIPPED | OUTPATIENT
Start: 2025-04-09

## 2025-04-09 NOTE — LETTER
April 9, 2025     Patient: Sheldon Harding  YOB: 1980  Date of Visit: 4/9/2025      To Whom it May Concern:    Sheldon Harding is under my professional care. Sheldon was seen in my office on 4/9/2025. Sheldon may return to work on 4/14/2025 .    If you have any questions or concerns, please don't hesitate to call.         Sincerely,          Juan Arzola MD        CC: No Recipients

## 2025-04-11 ENCOUNTER — RESULTS FOLLOW-UP (OUTPATIENT)
Age: 45
End: 2025-04-11

## 2025-04-11 NOTE — PROGRESS NOTES
"Name: Sheldon Harding      : 1980      MRN: [id number]  Encounter Provider: Juan Arzola MD  Encounter Date: 2025   Encounter department: [facility] PRIMARY CARE  :  Assessment & Plan  Acute upper respiratory infection  Add antibiotic if not improving and test for COVID / flu. Discussed supportive care and return parameters.   Orders:    amoxicillin (AMOXIL) 500 MG tablet; Take 1 tablet (500 mg total) by mouth 3 (three) times a day for 7 days    Covid/Flu- Office Collect Normal    Uncomplicated asthma, unspecified asthma severity, unspecified whether persistent  Stable, continue meds. Discussed supportive care and return parameters.        OAB (overactive bladder)  Stable, continue meds. But will switch to IR due to cost. Discussed supportive care and return parameters.   Orders:    oxybutynin (DITROPAN) 5 mg tablet; Take 1 tablet (5 mg total) by mouth 3 (three) times a day           History of Present Illness   Patient is a 45 y/o male who presents c/o cough congestion fevers chills sinus pressure no fevers chills nausea or vomiting, also with asthma and OAB admits being stable on meds.    Fever  Associated symptoms include chills, congestion, coughing and a fever.     Review of Systems   Constitutional:  Positive for chills and fever.   HENT:  Positive for congestion and sinus pressure.    Eyes: Negative.    Respiratory:  Positive for cough.    Cardiovascular: Negative.    Gastrointestinal: Negative.    Endocrine: Negative.    Genitourinary: Negative.    Musculoskeletal: Negative.    Allergic/Immunologic: Negative.    Neurological: Negative.    Hematological: Negative.    Psychiatric/Behavioral: Negative.     All other systems reviewed and are negative.      Objective   /80 (BP Location: Left arm, Patient Position: Sitting, Cuff Size: Large)   Pulse (!) 129   Temp (!) 101.6 °F (38.7 °C) (Temporal)   Resp 16   Ht 5' 7\" (1.702 m)   Wt 101 kg (222 lb)   SpO2 98%   BMI " 34.77 kg/m²      Physical Exam  Vitals reviewed.   Constitutional:       General: He is not in acute distress.     Appearance: He is well-developed. He is not diaphoretic.   HENT:      Head: Normocephalic and atraumatic.      Right Ear: External ear normal.      Left Ear: External ear normal.      Nose: Nose normal.   Eyes:      General: No scleral icterus.        Right eye: No discharge.         Left eye: No discharge.      Conjunctiva/sclera: Conjunctivae normal.      Pupils: Pupils are equal, round, and reactive to light.   Neck:      Thyroid: No thyromegaly.      Trachea: No tracheal deviation.   Cardiovascular:      Rate and Rhythm: Normal rate and regular rhythm.      Heart sounds: Normal heart sounds. No murmur heard.     No friction rub.   Pulmonary:      Effort: Pulmonary effort is normal. No respiratory distress.      Breath sounds: Normal breath sounds. No stridor. No wheezing or rales.   Abdominal:      General: There is no distension.      Palpations: Abdomen is soft. There is no mass.      Tenderness: There is no abdominal tenderness. There is no guarding or rebound.   Musculoskeletal:         General: Normal range of motion.      Cervical back: Normal range of motion and neck supple.   Lymphadenopathy:      Cervical: No cervical adenopathy.   Skin:     General: Skin is warm.   Neurological:      Mental Status: He is alert and oriented to person, place, and time.      Cranial Nerves: Cranial nerve deficit present.   Psychiatric:         Behavior: Behavior normal.         Thought Content: Thought content normal.         Judgment: Judgment normal.

## 2025-04-17 ENCOUNTER — TELEPHONE (OUTPATIENT)
Age: 45
End: 2025-04-17

## 2025-04-20 PROBLEM — Z00.00 ROUTINE ADULT HEALTH MAINTENANCE: Status: RESOLVED | Noted: 2018-10-11 | Resolved: 2025-04-20

## 2025-06-18 DIAGNOSIS — N32.81 OAB (OVERACTIVE BLADDER): ICD-10-CM

## 2025-06-18 RX ORDER — OXYBUTYNIN CHLORIDE 5 MG/1
5 TABLET ORAL 3 TIMES DAILY
Qty: 90 TABLET | Refills: 1 | Status: SHIPPED | OUTPATIENT
Start: 2025-06-18

## (undated) DEVICE — DRAPE EQUIPMENT RF WAND

## (undated) DEVICE — GLOVE INDICATOR PI UNDERGLOVE SZ 6.5 BLUE

## (undated) DEVICE — SUT VICRYL 0 UR-6 27 IN J603H

## (undated) DEVICE — COLUMN DRAPE

## (undated) DEVICE — EXOFIN PRECISION PEN HIGH VISCOSITY TOPICAL SKIN ADHESIVE: Brand: EXOFIN PRECISION PEN, 1G

## (undated) DEVICE — GLOVE INDICATOR PI UNDERGLOVE SZ 8 BLUE

## (undated) DEVICE — BLADELESS OBTURATOR: Brand: WECK VISTA

## (undated) DEVICE — GLOVE SRG BIOGEL 6.5

## (undated) DEVICE — DRAPE SHEET X-LG

## (undated) DEVICE — CANNULA SEAL

## (undated) DEVICE — PMI DISPOSABLE PUNCTURE CLOSURE DEVICE / SUTURE GRASPER: Brand: PMI

## (undated) DEVICE — CADIERE FORCEPS: Brand: ENDOWRIST

## (undated) DEVICE — [HIGH FLOW INSUFFLATOR,  DO NOT USE IF PACKAGE IS DAMAGED,  KEEP DRY,  KEEP AWAY FROM SUNLIGHT,  PROTECT FROM HEAT AND RADIOACTIVE SOURCES.]: Brand: PNEUMOSURE

## (undated) DEVICE — DRAPE LAPAROTOMY W/POUCHES

## (undated) DEVICE — INTENDED FOR TISSUE SEPARATION, AND OTHER PROCEDURES THAT REQUIRE A SHARP SURGICAL BLADE TO PUNCTURE OR CUT.: Brand: BARD-PARKER SAFETY BLADES SIZE 11, STERILE

## (undated) DEVICE — MEDIUM-LARGE CLIP APPLIER: Brand: ENDOWRIST

## (undated) DEVICE — CHLORAPREP HI-LITE 26ML ORANGE

## (undated) DEVICE — PENCIL ELECTROSURG E-Z CLEAN -0035H

## (undated) DEVICE — SUT MONOCRYL 4-0 PS-2 27 IN Y426H

## (undated) DEVICE — TISSUE RETRIEVAL SYSTEM: Brand: INZII RETRIEVAL SYSTEM

## (undated) DEVICE — DISPOSABLE OR TOWEL: Brand: CARDINAL HEALTH

## (undated) DEVICE — GLOVE SRG BIOGEL ECLIPSE 7.5

## (undated) DEVICE — ARM DRAPE

## (undated) DEVICE — ALLENTOWN LAP CHOLE APP PACK: Brand: CARDINAL HEALTH

## (undated) DEVICE — TROCAR: Brand: KII SLEEVE

## (undated) DEVICE — MAYO STAND COVER: Brand: CONVERTORS

## (undated) DEVICE — ASTOUND STANDARD SURGICAL GOWN, XL: Brand: CONVERTORS

## (undated) DEVICE — HEM-O-LOK CLIP CARTRIDGE MED/LARGE DA VINCI SI/XI

## (undated) DEVICE — STAPLER CANNULA SEAL: Brand: ENDOWRIST

## (undated) DEVICE — PERMANENT CAUTERY HOOK: Brand: ENDOWRIST

## (undated) DEVICE — REDUCER: Brand: ENDOWRIST

## (undated) DEVICE — NEEDLE HYPO 22G X 1-1/2 IN

## (undated) DEVICE — SCD SEQUENTIAL COMPRESSION COMFORT SLEEVE MEDIUM KNEE LENGTH: Brand: KENDALL SCD